# Patient Record
Sex: FEMALE | Race: BLACK OR AFRICAN AMERICAN | NOT HISPANIC OR LATINO | Employment: FULL TIME | RURAL
[De-identification: names, ages, dates, MRNs, and addresses within clinical notes are randomized per-mention and may not be internally consistent; named-entity substitution may affect disease eponyms.]

---

## 2021-01-04 ENCOUNTER — HISTORICAL (OUTPATIENT)
Dept: ADMINISTRATIVE | Facility: HOSPITAL | Age: 56
End: 2021-01-04

## 2021-05-05 DIAGNOSIS — M54.50 LOW BACK PAIN: Primary | ICD-10-CM

## 2021-05-19 PROBLEM — M54.50 LOW BACK PAIN: Status: ACTIVE | Noted: 2021-05-19

## 2021-07-06 ENCOUNTER — OFFICE VISIT (OUTPATIENT)
Dept: FAMILY MEDICINE | Facility: CLINIC | Age: 56
End: 2021-07-06
Payer: COMMERCIAL

## 2021-07-06 VITALS
BODY MASS INDEX: 37.76 KG/M2 | HEIGHT: 61 IN | RESPIRATION RATE: 18 BRPM | SYSTOLIC BLOOD PRESSURE: 134 MMHG | HEART RATE: 61 BPM | WEIGHT: 200 LBS | DIASTOLIC BLOOD PRESSURE: 84 MMHG | OXYGEN SATURATION: 97 %

## 2021-07-06 DIAGNOSIS — L03.116 CELLULITIS OF LEFT LOWER EXTREMITY: Primary | ICD-10-CM

## 2021-07-06 PROCEDURE — 87186 SC STD MICRODIL/AGAR DIL: CPT | Mod: ,,, | Performed by: CLINICAL MEDICAL LABORATORY

## 2021-07-06 PROCEDURE — 3008F PR BODY MASS INDEX (BMI) DOCUMENTED: ICD-10-PCS | Mod: ,,, | Performed by: FAMILY MEDICINE

## 2021-07-06 PROCEDURE — 87077 CULTURE AEROBIC IDENTIFY: CPT | Mod: ,,, | Performed by: CLINICAL MEDICAL LABORATORY

## 2021-07-06 PROCEDURE — 99213 OFFICE O/P EST LOW 20 MIN: CPT | Mod: 25,,, | Performed by: FAMILY MEDICINE

## 2021-07-06 PROCEDURE — 87070 CULTURE, WOUND: ICD-10-PCS | Mod: ,,, | Performed by: CLINICAL MEDICAL LABORATORY

## 2021-07-06 PROCEDURE — 96372 THER/PROPH/DIAG INJ SC/IM: CPT | Mod: ,,, | Performed by: FAMILY MEDICINE

## 2021-07-06 PROCEDURE — 96372 PR INJECTION,THERAP/PROPH/DIAG2ST, IM OR SUBCUT: ICD-10-PCS | Mod: ,,, | Performed by: FAMILY MEDICINE

## 2021-07-06 PROCEDURE — 87186 CULTURE, WOUND: ICD-10-PCS | Mod: ,,, | Performed by: CLINICAL MEDICAL LABORATORY

## 2021-07-06 PROCEDURE — 3008F BODY MASS INDEX DOCD: CPT | Mod: ,,, | Performed by: FAMILY MEDICINE

## 2021-07-06 PROCEDURE — 87070 CULTURE OTHR SPECIMN AEROBIC: CPT | Mod: ,,, | Performed by: CLINICAL MEDICAL LABORATORY

## 2021-07-06 PROCEDURE — 87077 CULTURE, WOUND: ICD-10-PCS | Mod: ,,, | Performed by: CLINICAL MEDICAL LABORATORY

## 2021-07-06 PROCEDURE — 99213 PR OFFICE/OUTPT VISIT, EST, LEVL III, 20-29 MIN: ICD-10-PCS | Mod: 25,,, | Performed by: FAMILY MEDICINE

## 2021-07-06 RX ORDER — CLINDAMYCIN HYDROCHLORIDE 300 MG/1
300 CAPSULE ORAL EVERY 8 HOURS
Qty: 21 CAPSULE | Refills: 0 | Status: SHIPPED | OUTPATIENT
Start: 2021-07-06 | End: 2021-07-12 | Stop reason: SDUPTHER

## 2021-07-06 RX ORDER — LIDOCAINE 30 MG/G
2 CREAM TOPICAL 4 TIMES DAILY PRN
Qty: 85 G | Refills: 1 | Status: SHIPPED | OUTPATIENT
Start: 2021-07-06 | End: 2021-08-05

## 2021-07-06 RX ORDER — ATENOLOL 50 MG/1
50 TABLET ORAL DAILY
COMMUNITY
Start: 2021-04-13 | End: 2021-09-16 | Stop reason: SDUPTHER

## 2021-07-06 RX ORDER — FLUCONAZOLE 150 MG/1
150 TABLET ORAL DAILY
Qty: 1 TABLET | Refills: 1 | Status: SHIPPED | OUTPATIENT
Start: 2021-07-06 | End: 2021-07-07

## 2021-07-08 ENCOUNTER — OFFICE VISIT (OUTPATIENT)
Dept: FAMILY MEDICINE | Facility: CLINIC | Age: 56
End: 2021-07-08
Payer: COMMERCIAL

## 2021-07-08 VITALS
BODY MASS INDEX: 38.26 KG/M2 | DIASTOLIC BLOOD PRESSURE: 73 MMHG | WEIGHT: 202.63 LBS | HEIGHT: 61 IN | OXYGEN SATURATION: 96 % | TEMPERATURE: 97 F | HEART RATE: 60 BPM | RESPIRATION RATE: 18 BRPM | SYSTOLIC BLOOD PRESSURE: 114 MMHG

## 2021-07-08 DIAGNOSIS — L03.116 CELLULITIS OF LEFT LOWER EXTREMITY: Primary | ICD-10-CM

## 2021-07-08 PROBLEM — I10 ESSENTIAL HYPERTENSION: Status: ACTIVE | Noted: 2021-07-08

## 2021-07-08 LAB — MICROORGANISM SPEC CULT: ABNORMAL

## 2021-07-08 PROCEDURE — 99214 PR OFFICE/OUTPT VISIT, EST, LEVL IV, 30-39 MIN: ICD-10-PCS | Mod: 25,,, | Performed by: FAMILY MEDICINE

## 2021-07-08 PROCEDURE — 99214 OFFICE O/P EST MOD 30 MIN: CPT | Mod: 25,,, | Performed by: FAMILY MEDICINE

## 2021-07-08 PROCEDURE — 10061 I&D ABSCESS COMP/MULTIPLE: CPT | Mod: ,,, | Performed by: FAMILY MEDICINE

## 2021-07-08 PROCEDURE — 10061 PR DRAIN SKIN ABSCESS COMPLIC: ICD-10-PCS | Mod: ,,, | Performed by: FAMILY MEDICINE

## 2021-07-08 PROCEDURE — 3008F PR BODY MASS INDEX (BMI) DOCUMENTED: ICD-10-PCS | Mod: ,,, | Performed by: FAMILY MEDICINE

## 2021-07-08 PROCEDURE — 3008F BODY MASS INDEX DOCD: CPT | Mod: ,,, | Performed by: FAMILY MEDICINE

## 2021-07-08 RX ORDER — CEPHALEXIN 500 MG/1
500 CAPSULE ORAL EVERY 8 HOURS
Qty: 21 CAPSULE | Refills: 0 | Status: SHIPPED | OUTPATIENT
Start: 2021-07-08 | End: 2021-07-08

## 2021-07-08 RX ORDER — HYDROCODONE BITARTRATE AND ACETAMINOPHEN 5; 325 MG/1; MG/1
1 TABLET ORAL EVERY 6 HOURS PRN
Qty: 15 TABLET | Refills: 0 | Status: SHIPPED | OUTPATIENT
Start: 2021-07-08 | End: 2021-09-16

## 2021-07-12 ENCOUNTER — OFFICE VISIT (OUTPATIENT)
Dept: FAMILY MEDICINE | Facility: CLINIC | Age: 56
End: 2021-07-12
Payer: COMMERCIAL

## 2021-07-12 VITALS
HEART RATE: 61 BPM | DIASTOLIC BLOOD PRESSURE: 86 MMHG | OXYGEN SATURATION: 98 % | WEIGHT: 198.19 LBS | BODY MASS INDEX: 37.42 KG/M2 | RESPIRATION RATE: 18 BRPM | SYSTOLIC BLOOD PRESSURE: 131 MMHG | HEIGHT: 61 IN

## 2021-07-12 DIAGNOSIS — L03.116 CELLULITIS OF LEFT LOWER EXTREMITY: Primary | ICD-10-CM

## 2021-07-12 PROCEDURE — 99024 POSTOP FOLLOW-UP VISIT: CPT | Mod: ,,, | Performed by: FAMILY MEDICINE

## 2021-07-12 PROCEDURE — 99024 PR POST-OP FOLLOW-UP VISIT: ICD-10-PCS | Mod: ,,, | Performed by: FAMILY MEDICINE

## 2021-07-12 RX ORDER — CLINDAMYCIN HYDROCHLORIDE 300 MG/1
300 CAPSULE ORAL EVERY 8 HOURS
Qty: 21 CAPSULE | Refills: 0 | Status: SHIPPED | OUTPATIENT
Start: 2021-07-12 | End: 2021-07-19

## 2021-07-15 ENCOUNTER — OFFICE VISIT (OUTPATIENT)
Dept: FAMILY MEDICINE | Facility: CLINIC | Age: 56
End: 2021-07-15
Payer: COMMERCIAL

## 2021-07-15 VITALS
HEART RATE: 61 BPM | RESPIRATION RATE: 18 BRPM | WEIGHT: 199.38 LBS | HEIGHT: 61 IN | SYSTOLIC BLOOD PRESSURE: 133 MMHG | OXYGEN SATURATION: 96 % | TEMPERATURE: 98 F | BODY MASS INDEX: 37.64 KG/M2 | DIASTOLIC BLOOD PRESSURE: 79 MMHG

## 2021-07-15 DIAGNOSIS — L03.116 CELLULITIS OF LEFT LOWER EXTREMITY: Primary | ICD-10-CM

## 2021-07-15 PROCEDURE — 3008F PR BODY MASS INDEX (BMI) DOCUMENTED: ICD-10-PCS | Mod: ,,, | Performed by: FAMILY MEDICINE

## 2021-07-15 PROCEDURE — 99024 PR POST-OP FOLLOW-UP VISIT: ICD-10-PCS | Mod: ,,, | Performed by: FAMILY MEDICINE

## 2021-07-15 PROCEDURE — 99024 POSTOP FOLLOW-UP VISIT: CPT | Mod: ,,, | Performed by: FAMILY MEDICINE

## 2021-07-15 PROCEDURE — 3008F BODY MASS INDEX DOCD: CPT | Mod: ,,, | Performed by: FAMILY MEDICINE

## 2021-09-16 ENCOUNTER — OFFICE VISIT (OUTPATIENT)
Dept: FAMILY MEDICINE | Facility: CLINIC | Age: 56
End: 2021-09-16
Payer: COMMERCIAL

## 2021-09-16 VITALS
WEIGHT: 200.81 LBS | TEMPERATURE: 97 F | RESPIRATION RATE: 18 BRPM | BODY MASS INDEX: 37.91 KG/M2 | OXYGEN SATURATION: 99 % | HEART RATE: 59 BPM | HEIGHT: 61 IN | SYSTOLIC BLOOD PRESSURE: 128 MMHG | DIASTOLIC BLOOD PRESSURE: 82 MMHG

## 2021-09-16 DIAGNOSIS — Z12.31 SCREENING MAMMOGRAM, ENCOUNTER FOR: ICD-10-CM

## 2021-09-16 DIAGNOSIS — Z13.220 ENCOUNTER FOR SCREENING FOR LIPID DISORDER: ICD-10-CM

## 2021-09-16 DIAGNOSIS — I10 ESSENTIAL HYPERTENSION: ICD-10-CM

## 2021-09-16 DIAGNOSIS — Z12.11 ENCOUNTER FOR SCREENING COLONOSCOPY: ICD-10-CM

## 2021-09-16 DIAGNOSIS — Z00.01 ANNUAL VISIT FOR GENERAL ADULT MEDICAL EXAMINATION WITH ABNORMAL FINDINGS: Primary | ICD-10-CM

## 2021-09-16 DIAGNOSIS — Z13.1 DIABETES MELLITUS SCREENING: ICD-10-CM

## 2021-09-16 DIAGNOSIS — Z90.710 HISTORY OF HYSTERECTOMY: ICD-10-CM

## 2021-09-16 PROCEDURE — 99396 PREV VISIT EST AGE 40-64: CPT | Mod: ,,, | Performed by: FAMILY MEDICINE

## 2021-09-16 PROCEDURE — 3008F PR BODY MASS INDEX (BMI) DOCUMENTED: ICD-10-PCS | Mod: ,,, | Performed by: FAMILY MEDICINE

## 2021-09-16 PROCEDURE — 3074F SYST BP LT 130 MM HG: CPT | Mod: ,,, | Performed by: FAMILY MEDICINE

## 2021-09-16 PROCEDURE — 80061 LIPID PANEL: CPT | Mod: ,,, | Performed by: CLINICAL MEDICAL LABORATORY

## 2021-09-16 PROCEDURE — 99396 PR PREVENTIVE VISIT,EST,40-64: ICD-10-PCS | Mod: ,,, | Performed by: FAMILY MEDICINE

## 2021-09-16 PROCEDURE — 80053 COMPREHEN METABOLIC PANEL: CPT | Mod: ,,, | Performed by: CLINICAL MEDICAL LABORATORY

## 2021-09-16 PROCEDURE — 1159F PR MEDICATION LIST DOCUMENTED IN MEDICAL RECORD: ICD-10-PCS | Mod: ,,, | Performed by: FAMILY MEDICINE

## 2021-09-16 PROCEDURE — 1160F PR REVIEW ALL MEDS BY PRESCRIBER/CLIN PHARMACIST DOCUMENTED: ICD-10-PCS | Mod: ,,, | Performed by: FAMILY MEDICINE

## 2021-09-16 PROCEDURE — 3074F PR MOST RECENT SYSTOLIC BLOOD PRESSURE < 130 MM HG: ICD-10-PCS | Mod: ,,, | Performed by: FAMILY MEDICINE

## 2021-09-16 PROCEDURE — 80061 LIPID PANEL: ICD-10-PCS | Mod: ,,, | Performed by: CLINICAL MEDICAL LABORATORY

## 2021-09-16 PROCEDURE — 3008F BODY MASS INDEX DOCD: CPT | Mod: ,,, | Performed by: FAMILY MEDICINE

## 2021-09-16 PROCEDURE — 80053 COMPREHENSIVE METABOLIC PANEL: ICD-10-PCS | Mod: ,,, | Performed by: CLINICAL MEDICAL LABORATORY

## 2021-09-16 PROCEDURE — 3079F PR MOST RECENT DIASTOLIC BLOOD PRESSURE 80-89 MM HG: ICD-10-PCS | Mod: ,,, | Performed by: FAMILY MEDICINE

## 2021-09-16 PROCEDURE — 3079F DIAST BP 80-89 MM HG: CPT | Mod: ,,, | Performed by: FAMILY MEDICINE

## 2021-09-16 PROCEDURE — 1159F MED LIST DOCD IN RCRD: CPT | Mod: ,,, | Performed by: FAMILY MEDICINE

## 2021-09-16 PROCEDURE — 1160F RVW MEDS BY RX/DR IN RCRD: CPT | Mod: ,,, | Performed by: FAMILY MEDICINE

## 2021-09-16 RX ORDER — ATENOLOL 50 MG/1
50 TABLET ORAL DAILY
Qty: 90 TABLET | Refills: 3 | Status: SHIPPED | OUTPATIENT
Start: 2021-09-16 | End: 2022-04-21

## 2021-09-17 LAB
ALBUMIN SERPL BCP-MCNC: 3.5 G/DL (ref 3.5–5)
ALBUMIN/GLOB SERPL: 0.8 {RATIO}
ALP SERPL-CCNC: 86 U/L (ref 41–108)
ALT SERPL W P-5'-P-CCNC: 34 U/L (ref 13–56)
ANION GAP SERPL CALCULATED.3IONS-SCNC: 9 MMOL/L (ref 7–16)
AST SERPL W P-5'-P-CCNC: 20 U/L (ref 15–37)
BILIRUB SERPL-MCNC: 0.4 MG/DL (ref 0–1.2)
BUN SERPL-MCNC: 12 MG/DL (ref 7–18)
BUN/CREAT SERPL: 12 (ref 6–20)
CALCIUM SERPL-MCNC: 9.7 MG/DL (ref 8.5–10.1)
CHLORIDE SERPL-SCNC: 105 MMOL/L (ref 98–107)
CHOLEST SERPL-MCNC: 147 MG/DL (ref 0–200)
CHOLEST/HDLC SERPL: 2.8 {RATIO}
CO2 SERPL-SCNC: 31 MMOL/L (ref 21–32)
CREAT SERPL-MCNC: 1.03 MG/DL (ref 0.55–1.02)
GLOBULIN SER-MCNC: 4.4 G/DL (ref 2–4)
GLUCOSE SERPL-MCNC: 112 MG/DL (ref 74–106)
HDLC SERPL-MCNC: 52 MG/DL (ref 40–60)
LDLC SERPL CALC-MCNC: 78 MG/DL
LDLC/HDLC SERPL: 1.5 {RATIO}
NONHDLC SERPL-MCNC: 95 MG/DL
POTASSIUM SERPL-SCNC: 4 MMOL/L (ref 3.5–5.1)
PROT SERPL-MCNC: 7.9 G/DL (ref 6.4–8.2)
SODIUM SERPL-SCNC: 141 MMOL/L (ref 136–145)
TRIGL SERPL-MCNC: 86 MG/DL (ref 35–150)
VLDLC SERPL-MCNC: 17 MG/DL

## 2021-09-20 PROBLEM — R73.01 IMPAIRED FASTING GLUCOSE: Status: ACTIVE | Noted: 2021-09-20

## 2021-09-24 ENCOUNTER — HOSPITAL ENCOUNTER (OUTPATIENT)
Dept: RADIOLOGY | Facility: HOSPITAL | Age: 56
Discharge: HOME | End: 2021-09-24
Attending: FAMILY MEDICINE
Payer: COMMERCIAL

## 2021-09-24 VITALS — HEIGHT: 61 IN | WEIGHT: 200 LBS | BODY MASS INDEX: 37.76 KG/M2

## 2021-09-24 DIAGNOSIS — Z12.31 SCREENING MAMMOGRAM, ENCOUNTER FOR: ICD-10-CM

## 2021-09-24 PROCEDURE — 77067 MAMMO DIGITAL SCREENING BILAT: ICD-10-PCS | Mod: 26,,, | Performed by: RADIOLOGY

## 2021-09-24 PROCEDURE — 77067 SCR MAMMO BI INCL CAD: CPT | Mod: 26,,, | Performed by: RADIOLOGY

## 2021-09-24 PROCEDURE — 77067 SCR MAMMO BI INCL CAD: CPT | Mod: TC

## 2021-10-21 ENCOUNTER — OFFICE VISIT (OUTPATIENT)
Dept: FAMILY MEDICINE | Facility: CLINIC | Age: 56
End: 2021-10-21
Payer: COMMERCIAL

## 2021-10-21 VITALS
DIASTOLIC BLOOD PRESSURE: 80 MMHG | BODY MASS INDEX: 38.07 KG/M2 | RESPIRATION RATE: 18 BRPM | HEART RATE: 62 BPM | SYSTOLIC BLOOD PRESSURE: 123 MMHG | WEIGHT: 201.63 LBS | TEMPERATURE: 98 F | OXYGEN SATURATION: 97 % | HEIGHT: 61 IN

## 2021-10-21 DIAGNOSIS — M25.511 CHRONIC PAIN OF BOTH SHOULDERS: Primary | ICD-10-CM

## 2021-10-21 DIAGNOSIS — M25.512 CHRONIC PAIN OF BOTH SHOULDERS: Primary | ICD-10-CM

## 2021-10-21 DIAGNOSIS — M54.9 DORSALGIA, UNSPECIFIED: ICD-10-CM

## 2021-10-21 DIAGNOSIS — M54.41 CHRONIC BILATERAL LOW BACK PAIN WITH BILATERAL SCIATICA: ICD-10-CM

## 2021-10-21 DIAGNOSIS — G89.29 CHRONIC BILATERAL LOW BACK PAIN WITH BILATERAL SCIATICA: ICD-10-CM

## 2021-10-21 DIAGNOSIS — G89.29 CHRONIC PAIN OF BOTH SHOULDERS: Primary | ICD-10-CM

## 2021-10-21 DIAGNOSIS — M54.42 CHRONIC BILATERAL LOW BACK PAIN WITH BILATERAL SCIATICA: ICD-10-CM

## 2021-10-21 PROCEDURE — 3074F PR MOST RECENT SYSTOLIC BLOOD PRESSURE < 130 MM HG: ICD-10-PCS | Mod: ,,, | Performed by: FAMILY MEDICINE

## 2021-10-21 PROCEDURE — 3008F BODY MASS INDEX DOCD: CPT | Mod: ,,, | Performed by: FAMILY MEDICINE

## 2021-10-21 PROCEDURE — 1160F PR REVIEW ALL MEDS BY PRESCRIBER/CLIN PHARMACIST DOCUMENTED: ICD-10-PCS | Mod: ,,, | Performed by: FAMILY MEDICINE

## 2021-10-21 PROCEDURE — 3079F PR MOST RECENT DIASTOLIC BLOOD PRESSURE 80-89 MM HG: ICD-10-PCS | Mod: ,,, | Performed by: FAMILY MEDICINE

## 2021-10-21 PROCEDURE — 99214 OFFICE O/P EST MOD 30 MIN: CPT | Mod: 25,,, | Performed by: FAMILY MEDICINE

## 2021-10-21 PROCEDURE — 3074F SYST BP LT 130 MM HG: CPT | Mod: ,,, | Performed by: FAMILY MEDICINE

## 2021-10-21 PROCEDURE — 1159F PR MEDICATION LIST DOCUMENTED IN MEDICAL RECORD: ICD-10-PCS | Mod: ,,, | Performed by: FAMILY MEDICINE

## 2021-10-21 PROCEDURE — 3079F DIAST BP 80-89 MM HG: CPT | Mod: ,,, | Performed by: FAMILY MEDICINE

## 2021-10-21 PROCEDURE — 3008F PR BODY MASS INDEX (BMI) DOCUMENTED: ICD-10-PCS | Mod: ,,, | Performed by: FAMILY MEDICINE

## 2021-10-21 PROCEDURE — 20610 DRAIN/INJ JOINT/BURSA W/O US: CPT | Mod: 50,,, | Performed by: FAMILY MEDICINE

## 2021-10-21 PROCEDURE — 1160F RVW MEDS BY RX/DR IN RCRD: CPT | Mod: ,,, | Performed by: FAMILY MEDICINE

## 2021-10-21 PROCEDURE — 20610 PR DRAIN/INJECT LARGE JOINT/BURSA: ICD-10-PCS | Mod: 50,,, | Performed by: FAMILY MEDICINE

## 2021-10-21 PROCEDURE — 1159F MED LIST DOCD IN RCRD: CPT | Mod: ,,, | Performed by: FAMILY MEDICINE

## 2021-10-21 PROCEDURE — 99214 PR OFFICE/OUTPT VISIT, EST, LEVL IV, 30-39 MIN: ICD-10-PCS | Mod: 25,,, | Performed by: FAMILY MEDICINE

## 2021-10-21 RX ORDER — METHYLPREDNISOLONE 4 MG/1
TABLET ORAL
Qty: 21 EACH | Refills: 0 | Status: SHIPPED | OUTPATIENT
Start: 2021-10-21 | End: 2021-11-11

## 2021-10-21 RX ORDER — GABAPENTIN 100 MG/1
100 CAPSULE ORAL NIGHTLY
Qty: 30 CAPSULE | Refills: 11 | Status: SHIPPED | OUTPATIENT
Start: 2021-10-21 | End: 2023-01-05

## 2021-10-21 RX ORDER — CYCLOBENZAPRINE HCL 10 MG
10 TABLET ORAL 3 TIMES DAILY PRN
Qty: 30 TABLET | Refills: 1 | Status: SHIPPED | OUTPATIENT
Start: 2021-10-21 | End: 2021-10-31

## 2021-10-26 ENCOUNTER — HOSPITAL ENCOUNTER (OUTPATIENT)
Dept: RADIOLOGY | Facility: HOSPITAL | Age: 56
Discharge: HOME | End: 2021-10-26
Attending: FAMILY MEDICINE
Payer: COMMERCIAL

## 2021-10-26 DIAGNOSIS — G89.29 CHRONIC PAIN OF BOTH SHOULDERS: ICD-10-CM

## 2021-10-26 DIAGNOSIS — M25.511 CHRONIC PAIN OF BOTH SHOULDERS: ICD-10-CM

## 2021-10-26 DIAGNOSIS — M25.512 CHRONIC PAIN OF BOTH SHOULDERS: ICD-10-CM

## 2021-10-26 DIAGNOSIS — M75.102 TEAR OF LEFT SUPRASPINATUS TENDON: Primary | ICD-10-CM

## 2021-10-26 PROCEDURE — 73221 MRI JOINT UPR EXTREM W/O DYE: CPT | Mod: TC,RT

## 2021-10-26 PROCEDURE — 73221 MRI JOINT UPR EXTREM W/O DYE: CPT | Mod: TC,LT

## 2022-01-28 ENCOUNTER — OFFICE VISIT (OUTPATIENT)
Dept: FAMILY MEDICINE | Facility: CLINIC | Age: 57
End: 2022-01-28
Payer: COMMERCIAL

## 2022-01-28 VITALS
RESPIRATION RATE: 18 BRPM | TEMPERATURE: 99 F | DIASTOLIC BLOOD PRESSURE: 70 MMHG | BODY MASS INDEX: 38.21 KG/M2 | WEIGHT: 202.38 LBS | SYSTOLIC BLOOD PRESSURE: 124 MMHG | OXYGEN SATURATION: 98 % | HEART RATE: 71 BPM | HEIGHT: 61 IN

## 2022-01-28 DIAGNOSIS — Z20.828 CONTACT WITH AND (SUSPECTED) EXPOSURE TO OTHER VIRAL COMMUNICABLE DISEASES: ICD-10-CM

## 2022-01-28 DIAGNOSIS — J32.9 RHINOSINUSITIS: ICD-10-CM

## 2022-01-28 DIAGNOSIS — R05.9 COUGH: ICD-10-CM

## 2022-01-28 DIAGNOSIS — U07.1 COVID: Primary | ICD-10-CM

## 2022-01-28 LAB
CTP QC/QA: YES
FLUAV AG NPH QL: NEGATIVE
FLUBV AG NPH QL: NEGATIVE
SARS-COV-2 AG RESP QL IA.RAPID: POSITIVE

## 2022-01-28 PROCEDURE — 3008F BODY MASS INDEX DOCD: CPT | Mod: ,,, | Performed by: NURSE PRACTITIONER

## 2022-01-28 PROCEDURE — 1160F PR REVIEW ALL MEDS BY PRESCRIBER/CLIN PHARMACIST DOCUMENTED: ICD-10-PCS | Mod: ,,, | Performed by: NURSE PRACTITIONER

## 2022-01-28 PROCEDURE — 3078F DIAST BP <80 MM HG: CPT | Mod: ,,, | Performed by: NURSE PRACTITIONER

## 2022-01-28 PROCEDURE — 3074F SYST BP LT 130 MM HG: CPT | Mod: ,,, | Performed by: NURSE PRACTITIONER

## 2022-01-28 PROCEDURE — 99213 OFFICE O/P EST LOW 20 MIN: CPT | Mod: ,,, | Performed by: NURSE PRACTITIONER

## 2022-01-28 PROCEDURE — 99213 PR OFFICE/OUTPT VISIT, EST, LEVL III, 20-29 MIN: ICD-10-PCS | Mod: ,,, | Performed by: NURSE PRACTITIONER

## 2022-01-28 PROCEDURE — 3074F PR MOST RECENT SYSTOLIC BLOOD PRESSURE < 130 MM HG: ICD-10-PCS | Mod: ,,, | Performed by: NURSE PRACTITIONER

## 2022-01-28 PROCEDURE — 87428 SARSCOV & INF VIR A&B AG IA: CPT | Mod: QW,,, | Performed by: NURSE PRACTITIONER

## 2022-01-28 PROCEDURE — 87428 POCT SARS-COV2 (COVID) WITH FLU ANTIGEN: ICD-10-PCS | Mod: QW,,, | Performed by: NURSE PRACTITIONER

## 2022-01-28 PROCEDURE — 1160F RVW MEDS BY RX/DR IN RCRD: CPT | Mod: ,,, | Performed by: NURSE PRACTITIONER

## 2022-01-28 PROCEDURE — 3078F PR MOST RECENT DIASTOLIC BLOOD PRESSURE < 80 MM HG: ICD-10-PCS | Mod: ,,, | Performed by: NURSE PRACTITIONER

## 2022-01-28 PROCEDURE — 1159F MED LIST DOCD IN RCRD: CPT | Mod: ,,, | Performed by: NURSE PRACTITIONER

## 2022-01-28 PROCEDURE — 1159F PR MEDICATION LIST DOCUMENTED IN MEDICAL RECORD: ICD-10-PCS | Mod: ,,, | Performed by: NURSE PRACTITIONER

## 2022-01-28 PROCEDURE — 3008F PR BODY MASS INDEX (BMI) DOCUMENTED: ICD-10-PCS | Mod: ,,, | Performed by: NURSE PRACTITIONER

## 2022-01-28 RX ORDER — AZITHROMYCIN 250 MG/1
TABLET, FILM COATED ORAL
Qty: 6 TABLET | Refills: 0 | Status: SHIPPED | OUTPATIENT
Start: 2022-01-28 | End: 2022-02-02

## 2022-01-28 RX ORDER — METHYLPREDNISOLONE 4 MG/1
TABLET ORAL
Qty: 21 EACH | Refills: 0 | Status: SHIPPED | OUTPATIENT
Start: 2022-01-28 | End: 2022-02-18

## 2022-01-28 NOTE — LETTER
January 28, 2022      [address]  [place] [address]  Phone: [phone]  Fax: [phone]       Patient: Sophia Kc   YOB: 1965  Date of Visit: 01/28/2022    To Whom It May Concern:    Maribeth Kc  was at [facility] on 01/28/2022. She has tested positive for Covid-19. Mrs. Kc may return to work/school on 02/01/2022 with no restrictions. If you have any questions or concerns, or if I can be of further assistance, please do not hesitate to contact me.    Sincerely,    Halle Charles RN

## 2022-02-01 NOTE — PROGRESS NOTES
STEPHANIE Jordan   [facility]  [id number] [place] [address] [place] [address]  [phone]      PATIENT NAME: Sophia Kc  : 1965  DATE: 22  MRN: [id number]      Billing Provider: STEPHANIE Jordan  Level of Service:   Patient PCP Information     Provider PCP Type    STEPHANIE Griffin General          Reason for Visit / Chief Complaint: Dizziness, Fatigue, Generalized Body Aches, and Headache       Update PCP  Update Chief Complaint         History of Present Illness / Problem Focused Workflow     Presents with complaints of dizziness, fatigue, body aches, headache for several days  Concerns of covid    Review of Systems     Review of Systems   Constitutional: Positive for fatigue. Negative for chills and fever.   HENT: Positive for congestion. Negative for ear pain and sore throat.    Eyes: Negative for discharge.   Respiratory: Positive for cough. Negative for shortness of breath.    Cardiovascular: Negative for chest pain.   Gastrointestinal: Negative for abdominal pain, diarrhea and nausea.   Musculoskeletal: Negative for gait problem.   Neurological: Positive for headaches. Negative for dizziness and weakness.   Psychiatric/Behavioral: Negative for dysphoric mood. The patient is not nervous/anxious.        Medical / Social / Family History     Past Medical History:   Diagnosis Date    Hx of colonoscopy 10/05/2021    negative    Hypertension        Past Surgical History:   Procedure Laterality Date    HYSTERECTOMY         Social History  Ms.  reports that she has never smoked. She has never used smokeless tobacco. She reports current alcohol use. She reports that she does not use drugs.    Family History  Ms.'s family history includes Breast cancer in her maternal aunt.    Medications and Allergies     Medications  Outpatient Medications Marked as Taking for the 22 encounter (Office Visit) with STEPHANIE Jordan   Medication Sig Dispense Refill     atenoloL (TENORMIN) 50 MG tablet Take 1 tablet (50 mg total) by mouth once daily. 90 tablet 3    gabapentin (NEURONTIN) 100 MG capsule Take 1 capsule (100 mg total) by mouth every evening. 30 capsule 11       Allergies  Review of patient's allergies indicates:   Allergen Reactions    Lisinopril        Physical Examination     Vitals:    01/28/22 1334   BP: 124/70   Pulse: 71   Resp: 18   Temp: 98.8 °F (37.1 °C)     Physical Exam  Constitutional:       General: She is not in acute distress.  HENT:      Head: Normocephalic.      Nose: Congestion present.      Mouth/Throat:      Mouth: Mucous membranes are moist.   Eyes:      Extraocular Movements: Extraocular movements intact.   Cardiovascular:      Rate and Rhythm: Normal rate.      Heart sounds: Normal heart sounds.   Pulmonary:      Effort: Pulmonary effort is normal. No respiratory distress.      Breath sounds: No wheezing.   Abdominal:      General: Bowel sounds are normal.      Palpations: Abdomen is soft.   Musculoskeletal:         General: Normal range of motion.      Cervical back: Normal range of motion.   Skin:     General: Skin is warm.   Neurological:      Mental Status: She is alert and oriented to person, place, and time.   Psychiatric:         Mood and Affect: Mood normal.           Imaging / Labs       Office Visit on 01/28/2022   Component Date Value Ref Range Status    SARS Coronavirus 2 Antigen 01/28/2022 Positive* Negative Final    Rapid Influenza A Ag 01/28/2022 Negative  Negative Final    Rapid Influenza B Ag 01/28/2022 Negative  Negative Final     Acceptable 01/28/2022 Yes   Final       Assessment and Plan (including Health Maintenance)      Problem List  Smart Sets  Document Outside HM   :    Health Maintenance Due   Topic Date Due    Hepatitis C Screening  Never done    COVID-19 Vaccine (1) Never done    HIV Screening  Never done    TETANUS VACCINE  Never done    Colorectal Cancer Screening  Never done     Shingles Vaccine (1 of 2) Never done    Influenza Vaccine (1) Never done       Problem List Items Addressed This Visit        Other    COVID - Primary      Other Visit Diagnoses     Contact with and (suspected) exposure to other viral communicable diseases        Relevant Orders    POCT SARS-COV2 (COVID) with Flu Antigen (Completed)    Rhinosinusitis        Relevant Medications    azithromycin (Z-CAROLANN) 250 MG tablet    methylPREDNISolone (MEDROL DOSEPACK) 4 mg tablet    Cough        Relevant Medications    methylPREDNISolone (MEDROL DOSEPACK) 4 mg tablet          Health Maintenance Topics with due status: Not Due       Topic Last Completion Date    Lipid Panel 09/16/2021    Mammogram 09/24/2021       Future Appointments   Date Time Provider Department Center   3/21/2022  8:00 AM MD MIKE Gunderson   9/19/2022  8:45 AM MD MIKE Gunderson          Signature:  STEPHANIE Jordan  [address] [address]  [phone]    Date of encounter: 1/28/22

## 2022-04-20 DIAGNOSIS — M25.512 LEFT SHOULDER PAIN, UNSPECIFIED CHRONICITY: Primary | ICD-10-CM

## 2022-04-21 ENCOUNTER — OFFICE VISIT (OUTPATIENT)
Dept: ORTHOPEDICS | Facility: CLINIC | Age: 57
End: 2022-04-21
Payer: COMMERCIAL

## 2022-04-21 ENCOUNTER — HOSPITAL ENCOUNTER (OUTPATIENT)
Dept: RADIOLOGY | Facility: HOSPITAL | Age: 57
Discharge: HOME | End: 2022-04-21
Attending: ORTHOPAEDIC SURGERY
Payer: COMMERCIAL

## 2022-04-21 ENCOUNTER — OFFICE VISIT (OUTPATIENT)
Dept: FAMILY MEDICINE | Facility: CLINIC | Age: 57
End: 2022-04-21
Payer: COMMERCIAL

## 2022-04-21 VITALS
SYSTOLIC BLOOD PRESSURE: 186 MMHG | OXYGEN SATURATION: 99 % | HEART RATE: 63 BPM | DIASTOLIC BLOOD PRESSURE: 96 MMHG | BODY MASS INDEX: 37.76 KG/M2 | TEMPERATURE: 98 F | WEIGHT: 200 LBS | HEIGHT: 61 IN | RESPIRATION RATE: 20 BRPM

## 2022-04-21 VITALS — WEIGHT: 202 LBS | HEIGHT: 61 IN | BODY MASS INDEX: 38.14 KG/M2

## 2022-04-21 DIAGNOSIS — Z11.4 SCREENING FOR HIV (HUMAN IMMUNODEFICIENCY VIRUS): ICD-10-CM

## 2022-04-21 DIAGNOSIS — G89.29 CHRONIC RIGHT SHOULDER PAIN: ICD-10-CM

## 2022-04-21 DIAGNOSIS — Z23 NEED FOR VACCINATION: ICD-10-CM

## 2022-04-21 DIAGNOSIS — M25.512 LEFT SHOULDER PAIN, UNSPECIFIED CHRONICITY: Primary | ICD-10-CM

## 2022-04-21 DIAGNOSIS — M25.511 CHRONIC RIGHT SHOULDER PAIN: ICD-10-CM

## 2022-04-21 DIAGNOSIS — M25.512 LEFT SHOULDER PAIN, UNSPECIFIED CHRONICITY: ICD-10-CM

## 2022-04-21 DIAGNOSIS — I10 ESSENTIAL HYPERTENSION: Primary | ICD-10-CM

## 2022-04-21 DIAGNOSIS — Z11.59 ENCOUNTER FOR HEPATITIS C SCREENING TEST FOR LOW RISK PATIENT: ICD-10-CM

## 2022-04-21 DIAGNOSIS — M75.121 COMPLETE TEAR OF RIGHT ROTATOR CUFF, UNSPECIFIED WHETHER TRAUMATIC: ICD-10-CM

## 2022-04-21 DIAGNOSIS — R73.01 IMPAIRED FASTING GLUCOSE: ICD-10-CM

## 2022-04-21 LAB
ALBUMIN SERPL BCP-MCNC: 3.6 G/DL (ref 3.5–5)
ALBUMIN/GLOB SERPL: 0.9 {RATIO}
ALP SERPL-CCNC: 91 U/L (ref 41–108)
ALT SERPL W P-5'-P-CCNC: 41 U/L (ref 13–56)
ANION GAP SERPL CALCULATED.3IONS-SCNC: 10 MMOL/L (ref 7–16)
AST SERPL W P-5'-P-CCNC: 19 U/L (ref 15–37)
BILIRUB SERPL-MCNC: 0.4 MG/DL (ref 0–1.2)
BUN SERPL-MCNC: 10 MG/DL (ref 7–18)
BUN/CREAT SERPL: 13 (ref 6–20)
CALCIUM SERPL-MCNC: 9.3 MG/DL (ref 8.5–10.1)
CHLORIDE SERPL-SCNC: 106 MMOL/L (ref 98–107)
CHOLEST SERPL-MCNC: 159 MG/DL (ref 0–200)
CHOLEST/HDLC SERPL: 3 {RATIO}
CO2 SERPL-SCNC: 27 MMOL/L (ref 21–32)
CREAT SERPL-MCNC: 0.8 MG/DL (ref 0.55–1.02)
GLOBULIN SER-MCNC: 4.1 G/DL (ref 2–4)
GLUCOSE SERPL-MCNC: 93 MG/DL (ref 74–106)
HDLC SERPL-MCNC: 53 MG/DL (ref 40–60)
HIV 1+O+2 AB SERPL QL: NORMAL
LDLC SERPL CALC-MCNC: 87 MG/DL
LDLC/HDLC SERPL: 1.6 {RATIO}
NONHDLC SERPL-MCNC: 106 MG/DL
POTASSIUM SERPL-SCNC: 3.7 MMOL/L (ref 3.5–5.1)
PROT SERPL-MCNC: 7.7 G/DL (ref 6.4–8.2)
SODIUM SERPL-SCNC: 139 MMOL/L (ref 136–145)
TRIGL SERPL-MCNC: 97 MG/DL (ref 35–150)
VLDLC SERPL-MCNC: 19 MG/DL

## 2022-04-21 PROCEDURE — 80061 LIPID PANEL: CPT | Mod: ,,, | Performed by: CLINICAL MEDICAL LABORATORY

## 2022-04-21 PROCEDURE — 73030 X-RAY EXAM OF SHOULDER: CPT | Mod: 26,RT,, | Performed by: ORTHOPAEDIC SURGERY

## 2022-04-21 PROCEDURE — 3077F SYST BP >= 140 MM HG: CPT | Mod: ,,, | Performed by: FAMILY MEDICINE

## 2022-04-21 PROCEDURE — 99214 OFFICE O/P EST MOD 30 MIN: CPT | Mod: 25,,, | Performed by: FAMILY MEDICINE

## 2022-04-21 PROCEDURE — 3080F DIAST BP >= 90 MM HG: CPT | Mod: ,,, | Performed by: FAMILY MEDICINE

## 2022-04-21 PROCEDURE — 90471 TDAP VACCINE GREATER THAN OR EQUAL TO 7YO IM: ICD-10-PCS | Mod: ,,, | Performed by: FAMILY MEDICINE

## 2022-04-21 PROCEDURE — 1160F RVW MEDS BY RX/DR IN RCRD: CPT | Mod: ,,, | Performed by: FAMILY MEDICINE

## 2022-04-21 PROCEDURE — 80053 COMPREHEN METABOLIC PANEL: CPT | Mod: ,,, | Performed by: CLINICAL MEDICAL LABORATORY

## 2022-04-21 PROCEDURE — 87389 HIV 1 / 2 ANTIBODY: ICD-10-PCS | Mod: ,,, | Performed by: CLINICAL MEDICAL LABORATORY

## 2022-04-21 PROCEDURE — 80061 LIPID PANEL: ICD-10-PCS | Mod: ,,, | Performed by: CLINICAL MEDICAL LABORATORY

## 2022-04-21 PROCEDURE — 73030 XR SHOULDER COMPLETE 2 OR MORE VIEWS RIGHT: ICD-10-PCS | Mod: 26,RT,, | Performed by: ORTHOPAEDIC SURGERY

## 2022-04-21 PROCEDURE — 73030 XR SHOULDER COMPLETE 2 OR MORE VIEWS LEFT: ICD-10-PCS | Mod: 26,LT,, | Performed by: ORTHOPAEDIC SURGERY

## 2022-04-21 PROCEDURE — 1160F PR REVIEW ALL MEDS BY PRESCRIBER/CLIN PHARMACIST DOCUMENTED: ICD-10-PCS | Mod: ,,, | Performed by: FAMILY MEDICINE

## 2022-04-21 PROCEDURE — 73030 X-RAY EXAM OF SHOULDER: CPT | Mod: 26,LT,, | Performed by: ORTHOPAEDIC SURGERY

## 2022-04-21 PROCEDURE — 83036 HEMOGLOBIN A1C: ICD-10-PCS | Mod: ,,, | Performed by: CLINICAL MEDICAL LABORATORY

## 2022-04-21 PROCEDURE — 80053 COMPREHENSIVE METABOLIC PANEL: ICD-10-PCS | Mod: ,,, | Performed by: CLINICAL MEDICAL LABORATORY

## 2022-04-21 PROCEDURE — 90715 TDAP VACCINE GREATER THAN OR EQUAL TO 7YO IM: ICD-10-PCS | Mod: ,,, | Performed by: FAMILY MEDICINE

## 2022-04-21 PROCEDURE — 3008F BODY MASS INDEX DOCD: CPT | Mod: ,,, | Performed by: ORTHOPAEDIC SURGERY

## 2022-04-21 PROCEDURE — 87389 HIV-1 AG W/HIV-1&-2 AB AG IA: CPT | Mod: ,,, | Performed by: CLINICAL MEDICAL LABORATORY

## 2022-04-21 PROCEDURE — 3008F PR BODY MASS INDEX (BMI) DOCUMENTED: ICD-10-PCS | Mod: ,,, | Performed by: ORTHOPAEDIC SURGERY

## 2022-04-21 PROCEDURE — 3080F PR MOST RECENT DIASTOLIC BLOOD PRESSURE >= 90 MM HG: ICD-10-PCS | Mod: ,,, | Performed by: FAMILY MEDICINE

## 2022-04-21 PROCEDURE — 73030 X-RAY EXAM OF SHOULDER: CPT | Mod: TC,LT

## 2022-04-21 PROCEDURE — 73030 X-RAY EXAM OF SHOULDER: CPT | Mod: TC,RT

## 2022-04-21 PROCEDURE — 90715 TDAP VACCINE 7 YRS/> IM: CPT | Mod: ,,, | Performed by: FAMILY MEDICINE

## 2022-04-21 PROCEDURE — 90471 IMMUNIZATION ADMIN: CPT | Mod: ,,, | Performed by: FAMILY MEDICINE

## 2022-04-21 PROCEDURE — 86803 HEPATITIS C AB TEST: CPT | Mod: ,,, | Performed by: CLINICAL MEDICAL LABORATORY

## 2022-04-21 PROCEDURE — 83036 HEMOGLOBIN GLYCOSYLATED A1C: CPT | Mod: ,,, | Performed by: CLINICAL MEDICAL LABORATORY

## 2022-04-21 PROCEDURE — 3008F BODY MASS INDEX DOCD: CPT | Mod: ,,, | Performed by: FAMILY MEDICINE

## 2022-04-21 PROCEDURE — 1159F MED LIST DOCD IN RCRD: CPT | Mod: ,,, | Performed by: FAMILY MEDICINE

## 2022-04-21 PROCEDURE — 3077F PR MOST RECENT SYSTOLIC BLOOD PRESSURE >= 140 MM HG: ICD-10-PCS | Mod: ,,, | Performed by: FAMILY MEDICINE

## 2022-04-21 PROCEDURE — 99205 PR OFFICE/OUTPT VISIT, NEW, LEVL V, 60-74 MIN: ICD-10-PCS | Mod: ,,, | Performed by: ORTHOPAEDIC SURGERY

## 2022-04-21 PROCEDURE — 1159F PR MEDICATION LIST DOCUMENTED IN MEDICAL RECORD: ICD-10-PCS | Mod: ,,, | Performed by: FAMILY MEDICINE

## 2022-04-21 PROCEDURE — 3008F PR BODY MASS INDEX (BMI) DOCUMENTED: ICD-10-PCS | Mod: ,,, | Performed by: FAMILY MEDICINE

## 2022-04-21 PROCEDURE — 99214 PR OFFICE/OUTPT VISIT, EST, LEVL IV, 30-39 MIN: ICD-10-PCS | Mod: 25,,, | Performed by: FAMILY MEDICINE

## 2022-04-21 PROCEDURE — 86803 HEPATITIS C ANTIBODY: ICD-10-PCS | Mod: ,,, | Performed by: CLINICAL MEDICAL LABORATORY

## 2022-04-21 PROCEDURE — 99205 OFFICE O/P NEW HI 60 MIN: CPT | Mod: ,,, | Performed by: ORTHOPAEDIC SURGERY

## 2022-04-21 RX ORDER — ATENOLOL AND CHLORTHALIDONE TABLET 50; 25 MG/1; MG/1
1 TABLET ORAL DAILY
Qty: 30 TABLET | Refills: 11 | Status: SHIPPED | OUTPATIENT
Start: 2022-04-21 | End: 2023-03-22 | Stop reason: SDUPTHER

## 2022-04-21 RX ORDER — POTASSIUM CHLORIDE 750 MG/1
10 CAPSULE, EXTENDED RELEASE ORAL DAILY
Qty: 90 CAPSULE | Refills: 1 | Status: SHIPPED | OUTPATIENT
Start: 2022-04-21 | End: 2023-01-09

## 2022-04-21 NOTE — PROGRESS NOTES
HPI:   Sophia Kc is a pleasant 56 y.o. patient who reports to clinic for evaluation of left and right  Shoulder pain. Pt states she has been in pain for over a year.  She had an injection back in October which did help for several months.  Her pain returned unfortunately. She works at [employer].   Injury onset and description: none  Patient's occupation: Direct Care Worker  This is not a work related injury.   she has not had formal physical therapy  she has had previous shoulder injections.   she has had advanced imaging such as MRI.   The shoulder pain worsens with activity and overhead motion. Pain is disruptive to sleep at night. The pain is better with rest. Treatment thus far has included rest and activity modification. Here today to discuss diagnosis and treatment options.   VAS Pain Scale:  7      PAST MEDICAL HISTORY:   Past Medical History:   Diagnosis Date    Hx of colonoscopy 10/05/2021    negative    Hypertension      PAST SURGICAL HISTORY:   Past Surgical History:   Procedure Laterality Date    HYSTERECTOMY       MEDICATIONS:    Current Outpatient Medications:     atenoloL (TENORMIN) 50 MG tablet, Take 1 tablet (50 mg total) by mouth once daily., Disp: 90 tablet, Rfl: 3    gabapentin (NEURONTIN) 100 MG capsule, Take 1 capsule (100 mg total) by mouth every evening., Disp: 30 capsule, Rfl: 11  ALLERGIES:   Review of patient's allergies indicates:   Allergen Reactions    Lisinopril      REVIEW OF SYSTEMS:  Constitution: Negative. Negative for chills, fever and night sweats. HENT: Negative for congestion and headaches.  Eyes: Negative for blurred vision, left vision loss and right vision loss. Cardiovascular: Negative for chest pain and syncope. Respiratory: Negative for cough and shortness of breath.  Endocrine: Negative for polydipsia, polyphagia and polyuria. Hematologic/Lymphatic: Negative for bleeding problem. Does not bruise/bleed easily. Skin: Negative for dry skin, itching  "and rash.   Musculoskeletal: Negative for falls. Positive for hand pain and muscle weakness.     PHYSICAL EXAM:  VITAL SIGNS: Ht 5' 1" (1.549 m)   Wt 91.6 kg (202 lb)   BMI 38.17 kg/m²   General: Well-developed well-nourished 56 y.o. femalein no acute distress;Cardiovascular: Regular rhythm by palpation of distal pulse, normal color and temperature, no concerning varicosities on symptomatic side Lungs: No labored breathing or wheezing appreciated Neuro: Alert and oriented ×3 Psychiatric: well oriented to person, place and time, demonstrates normal mood and affect Skin: No rashes, lesions or ulcers, normal temperature, turgor, and texture on uninvolved extremity    General    Nursing note and vitals reviewed.  Constitutional: She is oriented to person, place, and time. She appears well-developed and well-nourished. No distress.   HENT:   Head: Normocephalic and atraumatic.   Neck: Neck supple.   Cardiovascular: Normal rate, regular rhythm and intact distal pulses.    Pulmonary/Chest: Effort normal. No respiratory distress. She exhibits no tenderness.   Abdominal: Soft. She exhibits no distension. There is no abdominal tenderness.   Neurological: She is alert and oriented to person, place, and time. She has normal reflexes. She displays normal reflexes. No cranial nerve deficit. She exhibits normal muscle tone.   Psychiatric: She has a normal mood and affect. Her behavior is normal. Judgment and thought content normal.         Right Shoulder Exam     Inspection/Observation   Swelling: present  Scapular Dyskinesia: positive    Tenderness   The patient is tender to palpation of the acromioclavicular joint, supraspinatus, greater tuberosity and biceps tendon.    Crepitus   The patient has crepitus of the AC joint and greater tuberosity.    Range of Motion   Active abduction: abnormal   Extension: abnormal   Forward Flexion: abnormal   Forward Elevation: abnormal  Adduction: abnormal    Tests & Signs   Cross arm: " positive  Drop arm: positive  Lyn test: positive  Impingement: positive  Sulcus: absent  Rotator Cuff Painful Arc/Range: moderate  Anterosuperior Escape: negative  Lag Sign 90 degrees: positive  Lift Off Sign: positive  Belly Press: positive  Bear Hug: positive  Jerk Test: negative    Other   Sensation: normal    Comments:  Patient demonstrates evidence of pseudoparalysis with limited active forward elevation    Left Shoulder Exam   Left shoulder exam is normal.      Muscle Strength   Right Upper Extremity   Supraspinatus: 3/5   Subscapularis: 3/5   Biceps: 4/5     Reflexes     Right Side   Right Escalante's Sign:  absent    Vascular Exam     Right Pulses      Radial:                    2+            IMAGING:  X-ray Shoulder 2 or More Views Right    Result Date: 4/21/2022  See Procedure Notes for results. IMPRESSION: Please see Ortho procedure notes for report.  This procedure was auto-finalized by: Virtual Radiologist  Radiographs right shoulder demonstrate a lateral downsloping acromion with moderate AC joint osteoarthritis and a type 3 acromion noted.  There is also sclerosis on the greater tuberosity.  X-Ray Shoulder 2 or More Views Left    Result Date: 4/21/2022  See Procedure Notes for results. IMPRESSION: Please see Ortho procedure notes for report.  This procedure was auto-finalized by: Virtual Radiologist    Left shoulder radiographs demonstrate a lateral downsloping acromion moderate AC joint osteoarthritis and no glenohumeral joint osteoarthritis    Right shoulder MRI demonstrates a full-thickness and retracted tear of the supraspinatus and infraspinatus retracted to the mid glenoid level with fatty atrophy consistent with grade 2 atrophy.  There is arthritis in the acromioclavicular joint degenerative SLAP tear noted.  Left shoulder MRI was also reviewed demonstrating the presence of a partial-thickness supraspinatus tear.  ASSESSMENT:      ICD-10-CM ICD-9-CM   1. Left shoulder pain, unspecified  chronicity  M25.512 719.41       PLAN:     -Findings and treatment options were discussed with the patient  -All questions answered  Schedule and consent for right shoulder arthroscopy with SAD, DCE, RCR, biceps tenodesis    MRI findings were reviewed with the patient.  The patient has a symptomatic full-thickness rotator cuff tear with associated impingement findings.  Muscle quality is well maintained.  Treatment options were reviewed to include both operative and nonoperative measures.  The patient has failed an initial course of conservative treatment.  Given the extent and duration of the patient's complaints, operative intervention is certainly reasonable at this point.  The details of arthroscopic rotator cuff repair with subacromial decompression, biceps tenotomy versus tenodesis, labral debridement, distal clavicle excision, and possible Regeneten patch augmentation were discussed.    The patient understands the likely convalescence after surgery, in particular the expected postop rehab and recovery course. Alternatives both operative and non-operative with associated risks and benefits discussed. The outlined risks and potential complications of the proposed procedure include but are not limited to: infection, poor wound healing, scarring, stiffness, swelling, continued or recurrent pain, instability, hardware or prosthetic failure, symptomatic hardware requiring removal, weakness, neurovascular injury, numbness, chronic regional pain disorder, tissue nonhealing/irreparability/retear, contralateral ligament injury, chondral injury, arthritis, fracture, blood clot formation, inability to return to previous level of activity, anesthetic or regional block complication up to death, need for additional procedure as indicated, and potential need for further surgery.     she was also informed and understands the risks of surgery are greater for patients with a current condition or history of heart disease,  obesity, clotting disorders, recurrent infections, steroid use, current or past smoking, and factors such as sedentary lifestyle and noncompliance with medications, therapy or follow-up. The degree of the increased risk is hard to estimate with any degree of precision.    All questions were answered. The patient has verbalized understanding of these issues and wishes to proceed as discussed. The patient understands that recovery time can be up to 6-12 months.      There are no Patient Instructions on file for this visit.  Orders Placed This Encounter   Procedures    X-Ray Shoulder 2 or More Views Left     Standing Status:   Future     Number of Occurrences:   1     Standing Expiration Date:   4/21/2023     Order Specific Question:   May the Radiologist modify the order per protocol to meet the clinical needs of the patient?     Answer:   Yes     Order Specific Question:   Release to patient     Answer:   Immediate     Procedures

## 2022-04-21 NOTE — PROGRESS NOTES
Alejandra Gan MD        PATIENT NAME: Sophia Kc  : 1965  DATE: 22  MRN: [id number]      Billing Provider: Alejandra Gan MD  Level of Service:   Patient PCP Information     Provider PCP Type    Alejandra Gan MD General          Reason for Visit / Chief Complaint: Annual Exam (Blood pressure been reading 186/96 for about a month; she's been taking two pills to try to get it down. The bottom number been higher than the reading today)       History of Present Illness      Sophia Kc presents to the clinic with Annual Exam (Blood pressure been reading 186/96 for about a month; she's been taking two pills to try to get it down. The bottom number been higher than the reading today)     BP has been going up for the last month, she lost her so in an accident about a month ago, she does not feel depress, but she is grieving, she tried taking atenolol 100mg with no changes in BP noted at home      Review of Systems     Review of Systems   Constitutional: Negative for activity change and unexpected weight change.   HENT: Negative for hearing loss, rhinorrhea and trouble swallowing.    Eyes: Negative for discharge and visual disturbance.   Respiratory: Negative for chest tightness and wheezing.    Cardiovascular: Negative for chest pain and palpitations.   Gastrointestinal: Negative for blood in stool, constipation, diarrhea and vomiting.   Endocrine: Negative for polydipsia and polyuria.   Genitourinary: Negative for difficulty urinating, dysuria, hematuria and menstrual problem.   Musculoskeletal: Negative for arthralgias, joint swelling and neck pain.   Neurological: Negative for weakness and headaches.   Psychiatric/Behavioral: Positive for dysphoric mood. Negative for confusion.       Medical / Social / Family History     Past Medical History:   Diagnosis Date    Hx of colonoscopy 10/05/2021    negative    Hypertension        Past Surgical History:   Procedure Laterality Date    HYSTERECTOMY    "      Social History  Ms.  reports that she has never smoked. She has never used smokeless tobacco. She reports current alcohol use. She reports that she does not use drugs.    Family History  Ms.'s family history includes Breast cancer in her maternal aunt.    Medications and Allergies     Medications  Outpatient Medications Marked as Taking for the 4/21/22 encounter (Office Visit) with Alejandra Gan MD   Medication Sig Dispense Refill    gabapentin (NEURONTIN) 100 MG capsule Take 1 capsule (100 mg total) by mouth every evening. 30 capsule 11    [DISCONTINUED] atenoloL (TENORMIN) 50 MG tablet Take 1 tablet (50 mg total) by mouth once daily. 90 tablet 3       Allergies  Review of patient's allergies indicates:   Allergen Reactions    Lisinopril        Physical Examination   BP (!) 186/96   Pulse 63   Temp 97.7 °F (36.5 °C) (Temporal)   Resp 20   Ht 5' 1" (1.549 m)   Wt 90.7 kg (200 lb)   SpO2 99%   BMI 37.79 kg/m²     Physical Exam  Constitutional:       Appearance: Normal appearance. She is obese.   Cardiovascular:      Rate and Rhythm: Normal rate and regular rhythm.      Pulses: Normal pulses.      Heart sounds: Normal heart sounds.   Pulmonary:      Effort: Pulmonary effort is normal.      Breath sounds: Normal breath sounds.   Musculoskeletal:         General: Normal range of motion.   Skin:     General: Skin is warm.   Neurological:      General: No focal deficit present.      Mental Status: She is alert.   Psychiatric:         Mood and Affect: Mood normal.         Behavior: Behavior normal.         Judgment: Judgment normal.         Assessment and Plan (including Health Maintenance)     Plan:         Problem List Items Addressed This Visit        Cardiac/Vascular    Essential hypertension - Primary    Relevant Medications    atenoloL-chlorthalidone (TENORETIC) 50-25 mg Tab    potassium chloride (MICRO-K) 10 MEQ CpSR    Other Relevant Orders    Lipid Panel    Comprehensive Metabolic Panel       " Endocrine    Impaired fasting glucose    Relevant Orders    Hemoglobin A1C      Other Visit Diagnoses     Encounter for hepatitis C screening test for low risk patient        Relevant Orders    Hepatitis C Antibody    Screening for HIV (human immunodeficiency virus)        Relevant Orders    HIV 1/2 Ag/Ab (4th Gen)    Need for vaccination        Relevant Orders    (In Office Administered) Tdap Vaccine (Completed)      - work on diet, specifically cutting back bread, breaded things, cereal, pasta, potatoes, rice  - try to exercise at least 150min a week divided however you want  - if you can do weight, even very light weight do them  - try not to use to much salt, if any, remember that things that are preserved or frozen often contain large amounts of salt as a perseve  - she will be going for surgical intervention of rotator cuff in 2 weeks      Follow up in about 6 months (around 10/21/2022).        Signature:  Alejandra Gan MD      Date of encounter: 4/21/22

## 2022-04-22 LAB
EST. AVERAGE GLUCOSE BLD GHB EST-MCNC: 107 MG/DL
HBA1C MFR BLD HPLC: 5.8 % (ref 4.5–6.6)
HCV AB SER QL: NORMAL

## 2022-04-25 ENCOUNTER — PATIENT MESSAGE (OUTPATIENT)
Dept: FAMILY MEDICINE | Facility: CLINIC | Age: 57
End: 2022-04-25
Payer: COMMERCIAL

## 2022-04-25 DIAGNOSIS — M75.101 ROTATOR CUFF TEAR ARTHROPATHY OF RIGHT SHOULDER: Primary | ICD-10-CM

## 2022-04-25 DIAGNOSIS — Z01.818 PREPROCEDURAL EXAMINATION: Primary | ICD-10-CM

## 2022-04-25 DIAGNOSIS — M12.811 ROTATOR CUFF TEAR ARTHROPATHY OF RIGHT SHOULDER: Primary | ICD-10-CM

## 2022-04-25 RX ORDER — MUPIROCIN 20 MG/G
OINTMENT TOPICAL
Status: CANCELLED | OUTPATIENT
Start: 2022-04-25

## 2022-04-25 RX ORDER — SODIUM CHLORIDE 9 MG/ML
INJECTION, SOLUTION INTRAVENOUS CONTINUOUS
Status: CANCELLED | OUTPATIENT
Start: 2022-04-25

## 2022-09-26 ENCOUNTER — OFFICE VISIT (OUTPATIENT)
Dept: FAMILY MEDICINE | Facility: CLINIC | Age: 57
End: 2022-09-26
Payer: COMMERCIAL

## 2022-09-26 VITALS
OXYGEN SATURATION: 98 % | TEMPERATURE: 98 F | RESPIRATION RATE: 18 BRPM | HEART RATE: 78 BPM | DIASTOLIC BLOOD PRESSURE: 87 MMHG | BODY MASS INDEX: 37.5 KG/M2 | SYSTOLIC BLOOD PRESSURE: 130 MMHG | WEIGHT: 198.63 LBS | HEIGHT: 61 IN

## 2022-09-26 DIAGNOSIS — Z00.01 ENCOUNTER FOR GENERAL ADULT MEDICAL EXAMINATION WITH ABNORMAL FINDINGS: Primary | ICD-10-CM

## 2022-09-26 DIAGNOSIS — I10 ESSENTIAL HYPERTENSION: ICD-10-CM

## 2022-09-26 DIAGNOSIS — Z13.1 SCREENING FOR DIABETES MELLITUS: ICD-10-CM

## 2022-09-26 DIAGNOSIS — R73.01 IMPAIRED FASTING GLUCOSE: ICD-10-CM

## 2022-09-26 DIAGNOSIS — Z13.220 SCREENING FOR LIPOID DISORDERS: ICD-10-CM

## 2022-09-26 DIAGNOSIS — E66.9 OBESITY, UNSPECIFIED CLASSIFICATION, UNSPECIFIED OBESITY TYPE, UNSPECIFIED WHETHER SERIOUS COMORBIDITY PRESENT: ICD-10-CM

## 2022-09-26 DIAGNOSIS — M75.102 TEAR OF LEFT SUPRASPINATUS TENDON: ICD-10-CM

## 2022-09-26 DIAGNOSIS — Z12.39 ENCOUNTER FOR SCREENING FOR MALIGNANT NEOPLASM OF BREAST, UNSPECIFIED SCREENING MODALITY: ICD-10-CM

## 2022-09-26 LAB
CHOLEST SERPL-MCNC: 170 MG/DL (ref 0–200)
CHOLEST/HDLC SERPL: 3.3 {RATIO}
EST. AVERAGE GLUCOSE BLD GHB EST-MCNC: 117 MG/DL
GLUCOSE SERPL-MCNC: 160 MG/DL (ref 74–106)
HBA1C MFR BLD HPLC: 6.1 % (ref 4.5–6.6)
HDLC SERPL-MCNC: 51 MG/DL (ref 40–60)
LDLC SERPL CALC-MCNC: 78 MG/DL
LDLC/HDLC SERPL: 1.5 {RATIO}
NONHDLC SERPL-MCNC: 119 MG/DL
TRIGL SERPL-MCNC: 205 MG/DL (ref 35–150)
VLDLC SERPL-MCNC: 41 MG/DL

## 2022-09-26 PROCEDURE — 3044F PR MOST RECENT HEMOGLOBIN A1C LEVEL <7.0%: ICD-10-PCS | Mod: ,,, | Performed by: FAMILY MEDICINE

## 2022-09-26 PROCEDURE — 1160F PR REVIEW ALL MEDS BY PRESCRIBER/CLIN PHARMACIST DOCUMENTED: ICD-10-PCS | Mod: ,,, | Performed by: FAMILY MEDICINE

## 2022-09-26 PROCEDURE — 3075F PR MOST RECENT SYSTOLIC BLOOD PRESS GE 130-139MM HG: ICD-10-PCS | Mod: ,,, | Performed by: FAMILY MEDICINE

## 2022-09-26 PROCEDURE — 1160F RVW MEDS BY RX/DR IN RCRD: CPT | Mod: ,,, | Performed by: FAMILY MEDICINE

## 2022-09-26 PROCEDURE — 3008F PR BODY MASS INDEX (BMI) DOCUMENTED: ICD-10-PCS | Mod: ,,, | Performed by: FAMILY MEDICINE

## 2022-09-26 PROCEDURE — 3079F PR MOST RECENT DIASTOLIC BLOOD PRESSURE 80-89 MM HG: ICD-10-PCS | Mod: ,,, | Performed by: FAMILY MEDICINE

## 2022-09-26 PROCEDURE — 3079F DIAST BP 80-89 MM HG: CPT | Mod: ,,, | Performed by: FAMILY MEDICINE

## 2022-09-26 PROCEDURE — 99396 PR PREVENTIVE VISIT,EST,40-64: ICD-10-PCS | Mod: 25,,, | Performed by: FAMILY MEDICINE

## 2022-09-26 PROCEDURE — 83036 HEMOGLOBIN GLYCOSYLATED A1C: CPT | Mod: ,,, | Performed by: CLINICAL MEDICAL LABORATORY

## 2022-09-26 PROCEDURE — 82947 GLUCOSE, FASTING: ICD-10-PCS | Mod: ,,, | Performed by: CLINICAL MEDICAL LABORATORY

## 2022-09-26 PROCEDURE — 3008F BODY MASS INDEX DOCD: CPT | Mod: ,,, | Performed by: FAMILY MEDICINE

## 2022-09-26 PROCEDURE — 96372 THER/PROPH/DIAG INJ SC/IM: CPT | Mod: ,,, | Performed by: FAMILY MEDICINE

## 2022-09-26 PROCEDURE — 3075F SYST BP GE 130 - 139MM HG: CPT | Mod: ,,, | Performed by: FAMILY MEDICINE

## 2022-09-26 PROCEDURE — 83036 HEMOGLOBIN A1C: ICD-10-PCS | Mod: ,,, | Performed by: CLINICAL MEDICAL LABORATORY

## 2022-09-26 PROCEDURE — 80061 LIPID PANEL: CPT | Mod: ,,, | Performed by: CLINICAL MEDICAL LABORATORY

## 2022-09-26 PROCEDURE — 82947 ASSAY GLUCOSE BLOOD QUANT: CPT | Mod: ,,, | Performed by: CLINICAL MEDICAL LABORATORY

## 2022-09-26 PROCEDURE — 1159F MED LIST DOCD IN RCRD: CPT | Mod: ,,, | Performed by: FAMILY MEDICINE

## 2022-09-26 PROCEDURE — 1159F PR MEDICATION LIST DOCUMENTED IN MEDICAL RECORD: ICD-10-PCS | Mod: ,,, | Performed by: FAMILY MEDICINE

## 2022-09-26 PROCEDURE — 80061 LIPID PANEL: ICD-10-PCS | Mod: ,,, | Performed by: CLINICAL MEDICAL LABORATORY

## 2022-09-26 PROCEDURE — 99396 PREV VISIT EST AGE 40-64: CPT | Mod: 25,,, | Performed by: FAMILY MEDICINE

## 2022-09-26 PROCEDURE — 3044F HG A1C LEVEL LT 7.0%: CPT | Mod: ,,, | Performed by: FAMILY MEDICINE

## 2022-09-26 PROCEDURE — 96372 PR INJECTION,THERAP/PROPH/DIAG2ST, IM OR SUBCUT: ICD-10-PCS | Mod: ,,, | Performed by: FAMILY MEDICINE

## 2022-09-26 RX ORDER — CYCLOBENZAPRINE HCL 10 MG
10 TABLET ORAL 3 TIMES DAILY PRN
Qty: 30 TABLET | Refills: 0 | Status: SHIPPED | OUTPATIENT
Start: 2022-09-26 | End: 2022-10-06

## 2022-09-26 RX ORDER — TRIAMCINOLONE ACETONIDE 40 MG/ML
40 INJECTION, SUSPENSION INTRA-ARTICULAR; INTRAMUSCULAR ONCE
Status: COMPLETED | OUTPATIENT
Start: 2022-09-26 | End: 2022-09-26

## 2022-09-26 RX ADMIN — TRIAMCINOLONE ACETONIDE 40 MG: 40 INJECTION, SUSPENSION INTRA-ARTICULAR; INTRAMUSCULAR at 11:09

## 2022-09-26 NOTE — PROGRESS NOTES
Subjective     Patient ID: Sophia Kc is a 57 y.o. female.    Chief Complaint: Healthy You    Right shoulder pain chronic, was unable to get surgical intervention, the shot I have her last October did help, would like to know if I can give her another one     Would like to loose weight    She does not need refills at this time    She has multiple  aches and pain which is worsen due to increase weight       dental visits discussed    sun screen use discussed, use of helmets and seat belts discussed  Gun safety discussed  Sleep discussed  Diet and exercise discussed          Review of Systems   Constitutional:  Negative for activity change and fatigue.   Respiratory:  Negative for shortness of breath.    Cardiovascular:  Negative for chest pain, palpitations and leg swelling.   Gastrointestinal:  Negative for change in bowel habit, constipation and change in bowel habit.   Endocrine: Negative for polydipsia, polyphagia and polyuria.   Musculoskeletal:  Negative for gait problem, neck pain and neck stiffness.   Integumentary:  Negative for rash.   Psychiatric/Behavioral:  Negative for behavioral problems and suicidal ideas.      Tobacco Use: Low Risk     Smoking Tobacco Use: Never    Smokeless Tobacco Use: Never    Passive Exposure: Not on file     Review of patient's allergies indicates:   Allergen Reactions    Lisinopril      Current Outpatient Medications   Medication Instructions    atenoloL-chlorthalidone (TENORETIC) 50-25 mg Tab 1 tablet, Oral, Daily    cyclobenzaprine (FLEXERIL) 10 mg, Oral, 3 times daily PRN    gabapentin (NEURONTIN) 100 mg, Oral, Nightly    potassium chloride (MICRO-K) 10 MEQ CpSR 10 mEq, Oral, Daily     There are no discontinued medications.    Past Medical History:   Diagnosis Date    Hx of colonoscopy 10/05/2021    negative    Hypertension      Health Maintenance Topics with due status: Not Due       Topic Last Completion Date    TETANUS VACCINE 04/21/2022    Lipid Panel 04/21/2022  "    Immunization History   Administered Date(s) Administered    COVID-19, MRNA, LN-S, PF (MODERNA FULL 0.5 ML DOSE) 02/09/2022, 03/14/2022    Tdap 04/21/2022       Objective     Body mass index is 37.53 kg/m².  Wt Readings from Last 3 Encounters:   09/26/22 90.1 kg (198 lb 9.6 oz)   04/21/22 90.7 kg (200 lb)   04/21/22 91.6 kg (202 lb)     Ht Readings from Last 3 Encounters:   09/26/22 5' 1" (1.549 m)   04/21/22 5' 1" (1.549 m)   04/21/22 5' 1" (1.549 m)     BP Readings from Last 3 Encounters:   09/26/22 130/87   04/21/22 (!) 186/96   01/28/22 124/70     Temp Readings from Last 3 Encounters:   09/26/22 97.9 °F (36.6 °C) (Oral)   04/21/22 97.7 °F (36.5 °C) (Temporal)   01/28/22 98.8 °F (37.1 °C)     Pulse Readings from Last 3 Encounters:   09/26/22 78   04/21/22 63   01/28/22 71     Resp Readings from Last 3 Encounters:   09/26/22 18   04/21/22 20   01/28/22 18     PF Readings from Last 3 Encounters:   No data found for PF       Physical Exam  Constitutional:       Appearance: Normal appearance. She is obese. She is not ill-appearing.   Cardiovascular:      Rate and Rhythm: Normal rate and regular rhythm.   Pulmonary:      Effort: Pulmonary effort is normal.      Breath sounds: Normal breath sounds.   Musculoskeletal:      Right shoulder: Tenderness present. Decreased range of motion. Decreased strength.   Neurological:      General: No focal deficit present.      Mental Status: She is alert and oriented to person, place, and time.   Psychiatric:         Mood and Affect: Mood normal.         Behavior: Behavior normal.         Judgment: Judgment normal.     Procedure: Shoulder injection  Performed by Dr. Gan  Reason for procedure:  After informed consent was obtained, A Time out was performed with patient.  Area was cleansed with rubbing alcohol in sterile fashion. Topical anesthesia was then achieved using Gebaur's Ethyl Chloride. The right shoulder joint was then injected using a posterior approach. After " initially entering joint, plunger was withdrawn to prevent entry into blood vessel. After appropriate anatomical location was confirmed, 3mL of 1%Lidocaine w/o Epi and 1mL of Kenalog (40mg) with 3 mL of 0.25% marcaine was injected into the joint. The needle was withdrawn and direct pressure was applied over the site. The joint was then mobilized into an abbreviated arc of motion and pt was advised to stay seated to prevent any falls after injection. Pt tolerated procedure well and bandage was placed over injection site.    After care instruction given      Assessment and Plan     Problem List Items Addressed This Visit          Cardiac/Vascular    Essential hypertension       Endocrine    Impaired fasting glucose    Relevant Orders    Ambulatory referral/consult to Diabetes Education (Education Only)     Other Visit Diagnoses       Encounter for general adult medical examination with abnormal findings    -  Primary    Screening for diabetes mellitus        Relevant Orders    Hemoglobin A1C    Glucose, Fasting    Screening for lipoid disorders        Relevant Orders    Lipid Panel    Encounter for screening for malignant neoplasm of breast, unspecified screening modality        Relevant Orders    Mammo Digital Screening Bilat    Tear of left supraspinatus tendon        Relevant Medications    triamcinolone acetonide injection 40 mg (Completed) (Start on 9/26/2022 12:00 PM)    Other Relevant Orders    Ambulatory referral/consult to Physical/Occupational Therapy    Obesity, unspecified classification, unspecified obesity type, unspecified whether serious comorbidity present        BMI 37.0-37.9, adult                  Plan:   she is going to try to improve healthy choices, reduce calorie intake, try to do more physical activity to lose weight. Recommended at least 150 minutes a week with resistance training as tolerated  Monitor weight  attend regularly scheduled apts   Schedule yearly eye exam  Take medications as  prescirved  Improve nutrition, decrease car intake, decrease fast food  Stay away from tobacco products  Sun screen recommended and discuseed        I have reviewed the medications, allergies, and problem list.     Goal Actions:    What type of visit is the patient here for today?: Healthy You  Does the patient consent to enroll in Color Me Healthy?: Yes  Is this a Wellness Follow Up?: No  What is your overall wellness goal? (select at least one): Improve overall health  Choose 3: Biometric, Lifestyle, Nutrition  Biometric Actions: Attend regularly scheduled office visits  Lifestyle Actions : Obtain yearly mammogram  Nurtrition Actions: Avoid adding table salt      Alejandra Gan MD

## 2022-09-26 NOTE — PATIENT INSTRUCTIONS
"Patient Education       High Cholesterol   The Basics   Written by the doctors and editors at [facility]   What is cholesterol? -- Cholesterol is a substance that is found in the blood. Everyone has some. It is needed for good health. The problem is, people sometimes have too much cholesterol. Compared with people with normal cholesterol, people with high cholesterol have a higher risk of heart attacks, strokes, and other health problems. The higher your cholesterol, the higher your risk of these problems.  Are there different types of cholesterol? -- Yes, there are a few different types. If you get a cholesterol test, you might hear your doctor or nurse talk about:  Total cholesterol  LDL cholesterol - Some people call this the "bad" cholesterol. That's because having high LDL levels raises your risk of heart attacks, strokes, and other health problems.  HDL cholesterol - Some people call this the "good" cholesterol. That's because people with high HDL levels tend to have a lower risk of heart attacks, strokes, and other health problems.   Non-HDL cholesterol - Non-HDL cholesterol is your total cholesterol minus your HDL cholesterol.  Triglycerides - Triglycerides are not cholesterol. They are another type of fat. But they often get measured when cholesterol is measured. (Having high triglycerides also seems to increase the risk of heart attacks and strokes.)  What should my numbers be? -- Ask your doctor or nurse what your numbers should be. Different people need different goals. (If you live outside the [place], see the table (table 1)). In general, people who do not already have heart disease should aim for:  Total cholesterol below 200  LDL cholesterol below 130 - or much lower, if they are at risk of heart attacks or strokes  HDL cholesterol above 60  Non-HDL cholesterol below 160 - or lower, if they are at risk of heart attacks or strokes  Triglycerides below 150  Keep in mind, though, that many people " "who cannot meet these goals still have a low risk of heart attacks and strokes.  What should I do if my doctor tells me I have high cholesterol? -- Ask your doctor what your overall risk of heart attacks and strokes is. High cholesterol, by itself, is not always a reason to worry. Having high cholesterol is just one of many things that can increase your risk of heart attacks and strokes. Other factors that increase your risk include:  Cigarette smoking  High blood pressure  Having a parent, sister, or brother who got heart disease at a young age - Young, in this case, means younger than 55 for men and younger than 65 for women.  A diet that is not heart healthy - A "heart-healthy" diet includes lots of fruits and vegetables, fiber, and healthy fats (like those found in fish and certain oils). It also means limiting sugar and unhealthy fats.  Older age  If you are at high risk of heart attacks and strokes, having high cholesterol is a problem. On the other hand, if you are at low risk, having high cholesterol might not lead to treatment.  Should I take medicine to lower cholesterol? -- Not everyone who has high cholesterol needs medicines. Your doctor or nurse will decide if you need them based on your age, family history, and other health concerns.  You should probably take a cholesterol-lowering medicine called a statin if you:  Already had a heart attack or stroke  Have known heart disease  Have diabetes  Have a condition called peripheral artery disease, which makes it painful to walk, and happens when the arteries in your legs get clogged with fatty deposits  Have an abdominal aortic aneurysm, which is a widening of the main artery in the belly  Most people with any of the conditions listed above should take a statin no matter what their cholesterol level is. If your doctor or nurse puts you on a statin, stay on it. The medicine might not make you feel any different. But it can help prevent heart attacks, " strokes, and death.  Can I lower my cholesterol without medicines? -- Yes, you can lower your cholesterol some by:  Avoiding red meat, butter, fried foods, cheese, and other foods that have a lot of saturated fat  Losing weight (if you are overweight)  Being more active  Even if these steps do little to change your cholesterol, they can improve your health in many ways.  All topics are updated as new evidence becomes available and our peer review process is complete.  This topic retrieved from [employer] on: Sep 21, 2021.  Topic 22963 Version 19.0  Release: 29.4.2 - C29.263  © 2021 UpToDate, Inc. and/or its affiliates. All rights reserved.  table 1: Cholesterol and triglyceride measurements in the [place] and elsewhere     Measurement used within the [place] Milligrams/deciliter (mg/dL)  Measurement used most places outside the [place] Millimoles/liter (mmol/Liter)     Level to aim for  Level to aim for    Total cholesterol  Below 200 Below 5.17   LDL cholesterol  Below 130 - or much lower if at risk of heart attack and stroke Below 3.36 - or much lower if at risk of heart attack and stroke   HDL cholesterol  Above 60 Above 1.55   Triglycerides  Below 150 Below 1.7   Cholesterol is measured differently in the [place] than it is in most other countries. This table shows values used within and outside the [place]. It includes the cholesterol and triglyceride levels that most people who do not have heart disease should aim for.  Graphic 61698 Version 3.0  Consumer Information Use and Disclaimer   This information is not specific medical advice and does not replace information you receive from your health care provider. This is only a brief summary of general information. It does NOT include all information about conditions, illnesses, injuries, tests, procedures, treatments, therapies, discharge instructions or life-style choices that may apply to you. You must talk with your health care  provider for complete information about your health and treatment options. This information should not be used to decide whether or not to accept your health care provider's advice, instructions or recommendations. Only your health care provider has the knowledge and training to provide advice that is right for you. The use of this information is governed by the [employer] End User License Agreement, available at https://www.[employer]/en/solutions/Astrid/about/luc.The use of [employer] content is governed by the [employer] Terms of Use. ©2021 UpToDate, Inc. All rights reserved.  Copyright   © 2021 UpToDate, Inc. and/or its affiliates. All rights reserved.    Patient Education       Diet and Health   The Basics   Written by the doctors and editors at [facility]   Why is it important to eat a healthy diet? -- It's important to eat a healthy diet because eating the right foods can keep you healthy now and later on in life.  Which foods are especially healthy? -- Foods that are especially healthy include:  Fruits and vegetables - Eating a diet with lots of fruits and vegetables can help prevent heart disease and strokes. It might also help prevent certain types of cancers. Try to eat fruits and vegetables at each meal and also for snacks. If you don't have fresh fruits and vegetables available, you can eat frozen or canned ones instead. Doctors recommend eating at least 2 1/2 servings of vegetables and 2 servings of fruits each day.  Foods with fiber - Eating foods with a lot of fiber can help prevent heart disease and strokes. If you have type 2 diabetes, it can also help control your blood sugar. Foods that have a lot of fiber include vegetables, fruits, beans, nuts, oatmeal, and whole grain breads and cereals. You can tell how much fiber is in a food by reading the nutrition label (figure 1). Doctors recommend eating 25 to 36 grams of fiber each day.  Foods with folate (also called folic acid) - Folate is a  "vitamin that is important for pregnant people, since it helps prevent certain birth defects. Anyone who could get pregnant should get at least 400 micrograms of folic acid daily, whether or not they are actively trying to get pregnant. Folate is found in many breakfast cereals, oranges, orange juice, and green leafy vegetables.  Foods with calcium and vitamin D - Babies, children, and adults need calcium and vitamin D to help keep their bones strong. Adults also need calcium and vitamin D to help prevent osteoporosis. Osteoporosis is a condition that causes bones to get "thin" and break more easily than usual. Different foods and drinks have calcium and vitamin D in them (figure 2). People who don't get enough calcium and vitamin D in their diet might need to take a supplement.  Foods with protein - Protein helps your muscles stay strong. Healthy foods with a lot of protein include chicken, fish, eggs, beans, nuts, and soy products. Red meat also has a lot of protein, but it also contains fats, which can be unhealthy.  Some experts recommend a "Mediterranean diet." This involves eating a lot of fruits, vegetables, nuts, whole grains, and olive oil. It also includes some fish, poultry, and dairy products, but not a lot of red meat. Eating this way can help your overall health, and might even lower your risk of having a stroke.  What foods should I avoid or limit? -- To eat a healthy diet, there are some things you should avoid or limit. They include:   Fats - There are different types of fats. Some types of fats are better for your body than others.  Trans fats are especially unhealthy. They are found in margarines, many fast foods, and some store-bought baked goods. Trans fats can raise your cholesterol level and your increase your chance of getting heart disease. You should avoid eating foods with these types of fats.  The type of "polyunsaturated" fats found in fish seems to be healthy and can reduce your chance " "of getting heart disease. Other polyunsaturated fats might also be good for your health. When you cook, it's best to use oils with healthier fats, such as olive oil and canola oil.  Sugar - To have a healthy diet, it's important to limit or avoid sugar, sweets, and refined grains. Refined grains are found in white bread, white rice, most forms of pasta, and most packaged "snack" foods. Whole grains, such as whole-wheat bread and brown rice, have more fiber and are better for your health.  Avoiding sugar-sweetened beverages, like soda and sports drinks, can also help improve your health.  Red meat - Studies have shown that eating a lot of red meat can increase your risk of certain health problems, including heart disease and cancer. You should limit the amount of red meat that you eat.  Can I drink alcohol as part of a healthy diet? -- People who drink a small amount of alcohol each day might have a lower chance of getting heart disease. But drinking alcohol can lead to problems. For example, it can raise a person's chances of getting liver disease and certain types of cancers. In women, even 1 drink a day can increase the risk of getting breast cancer.  Most doctors recommend that adult women not have more than 1 drink a day and that adult men not have more than 2 drinks a day. The limits are different because most women's bodies take longer to break down alcohol.  How many calories do I need each day? -- The number of calories you need each day depends on your weight, height, age, sex, and how active you are.  Your doctor or nurse can tell you how many calories you should eat each day. If you are trying to lose weight, you should eat fewer calories each day.  What if I have questions? -- If you have questions about which foods you should or should not eat, ask your doctor or nurse. The right diet for you will depend, in part, on your health and any medical conditions you have.  All topics are updated as new " "evidence becomes available and our peer review process is complete.  This topic retrieved from [employer] on: Sep 21, 2021.  Topic 20703 Version 20.0  Release: 29.4.2 - C29.263  © 2021 UpToDate, Inc. and/or its affiliates. All rights reserved.  figure 1: Nutrition label - fiber     This is an example of a nutrition label. To figure out how much fiber is in a food, look for the line that says "Dietary Fiber." It's also important to look at the serving size. This food has 7 grams of fiber in each serving, and each serving is 1 cup.  Graphic 52091 Version 7.0    figure 2: Foods and drinks with calcium and vitamin D     Foods rich in calcium include ice cream, soy milk, breads, kale, broccoli, milk, cheese, cottage cheese, almonds, yogurt, ready-to-eat cereals, beans, and tofu. Foods rich in vitamin D include milk, fortified plant-based "milks" (soy, almond), canned tuna fish, cod liver oil, yogurt, ready-to-eat-cereals, cooked salmon, canned sardines, mackerel, and eggs. Some of these foods are rich in both.  Graphic 98546 Version 4.0    Consumer Information Use and Disclaimer   This information is not specific medical advice and does not replace information you receive from your health care provider. This is only a brief summary of general information. It does NOT include all information about conditions, illnesses, injuries, tests, procedures, treatments, therapies, discharge instructions or life-style choices that may apply to you. You must talk with your health care provider for complete information about your health and treatment options. This information should not be used to decide whether or not to accept your health care provider's advice, instructions or recommendations. Only your health care provider has the knowledge and training to provide advice that is right for you. The use of this information is governed by the [employer] End User License Agreement, available at " https://www.[employer].com/en/solutions/lexicomp/about/luc.The use of [employer] content is governed by the [employer] Terms of Use. ©2021 [employer]. All rights reserved.  Copyright   © 2021 UpToDate, Inc. and/or its affiliates. All rights reserved.

## 2022-10-06 ENCOUNTER — CLINICAL SUPPORT (OUTPATIENT)
Dept: REHABILITATION | Facility: HOSPITAL | Age: 57
End: 2022-10-06
Payer: COMMERCIAL

## 2022-10-06 DIAGNOSIS — M75.102 TEAR OF LEFT SUPRASPINATUS TENDON: ICD-10-CM

## 2022-10-06 DIAGNOSIS — M75.101 TEAR OF RIGHT SUPRASPINATUS TENDON: Primary | ICD-10-CM

## 2022-10-06 DIAGNOSIS — M25.611 DECREASED ROM OF RIGHT SHOULDER: ICD-10-CM

## 2022-10-06 PROCEDURE — 97161 PT EVAL LOW COMPLEX 20 MIN: CPT | Mod: PN

## 2022-10-06 PROCEDURE — 97110 THERAPEUTIC EXERCISES: CPT | Mod: PN

## 2022-10-06 NOTE — PLAN OF CARE
[facility]   Physical Therapy Initial Evaluation    Date: 10/6/2022   Name: Sophia Kc  Clinic Number: [id number]    Therapy Diagnosis:   Encounter Diagnoses   Name Primary?    Tear of left supraspinatus tendon     Tear of right supraspinatus tendon Yes    Decreased ROM of right shoulder      Physician: Alejandra Gan MD    Physician Orders: PT Eval and Treat    Medical Diagnosis from Referral: bilateral shoulder rotator cuff tear , right worse than left   Evaluation Date: 10/6/2022  Updated Plan of Care Due : 11/5/2022  Authorization Period Expiration: [employer] 20 visits   Plan of Care Expiration: end of year   Visit # / Visits authorized: 1/ 20    Time In: 1100  Time Out: 1145  Total Appointment Time (timed & untimed codes): 45 minutes    Precautions: Standard    Subjective   Date of onset: pt states she has been having pain in bilateral shoulders for over a year . Pt states her dominant right shoulder Is worse than her left.  Pt has been told she needs right rotator cuff sx on the right with full thickness tear on the right supraspintous , partial , infraspintouse, and bicep tear.  Pt also has partial thickness tear left supraspinatous,  pt has pain that awakes her at night with the right side. Pt voices she cant afford sx and has injections with some improvement in range of motion and decreased pain.   History of current condition - Sophia reports: hx above.      Medical History:   Past Medical History:   Diagnosis Date    Hx of colonoscopy 10/05/2021    negative    Hypertension        Surgical History:   Sophia Kc  has a past surgical history that includes Hysterectomy.    Medications:   Sophia has a current medication list which includes the following prescription(s): atenolol-chlorthalidone, cyclobenzaprine, gabapentin, and potassium chloride.    Allergies:   Review of patient's allergies indicates:   Allergen Reactions    Lisinopril         Imaging, MRI studies:    Right shoulder MRI  demonstrates a full-thickness and retracted tear of the supraspinatus and infraspinatus retracted to the mid glenoid level with fatty atrophy consistent with grade 2 atrophy.  There is arthritis in the acromioclavicular joint degenerative SLAP tear noted.  Left shoulder MRI was also reviewed demonstrating the presence of a partial-thickness supraspinatus tear.    Prior Therapy: none   Social History:   lives alone  Occupation: [employer] , direct care worker   Prior Level of Function: pain free a year ago   Current Level of Function: limited range of motion and pain right shoulder.     Pain:  Current 5/10, worst 10/10, best 5/10   Location: right shoulder  bilateral shoulder   Description: Aching, Dull, Burning, Throbbing, Grabbing, and Tight  Aggravating Factors: Morning, Extension, Flexing, and Lifting  Easing Factors: nothing and rest    Patients goals: be able to dress pain free and work pain free.     Objective       Observation : poor scapular control , shoulder weakness.     Forward Head/Rounded Shoulders :  [x] Yes   [] No   Scapular Winging :  [x] Yes   [] No   Incision :    n/a     Comments :     Cervical Spine Clearing :wnl         Range of Motion/Strength :                  Left Extremity                                                                        Right Extremity     AROM   PROM  Strength                  Location   AROM    PROM   Strength    160   4   Shoulder    Flexion 140 170 3   135  4                      Abduction   125 170 3                       45  4                      ER in Adduction 45  3                              Thoracic 8  4                      Functional IR Lumbar 1  3   85 90 4                      ER in 90 Scaption 85 90 3                               Elbow         Flexion 140  4                           Extension 0  4                           Pron/Supination          Functional Impairments : impingement with bilateral rotator cuff tears            Rotator Cuff :     Right shoulder MRI demonstrates a full-thickness and retracted tear of the supraspinatus and infraspinatus retracted to the mid glenoid level with fatty atrophy consistent with grade 2 atrophy.  There is arthritis in the acromioclavicular joint degenerative SLAP tear noted.  Left shoulder MRI was also reviewed demonstrating the presence of a partial-thickness supraspinatus tear.    Labrum :    Elbow :     Other :    Palpation :  pain in bilateral supraspinatous, infraspinatous left, bicep right                      Limitation/Restriction for FOTO shoulder  Survey    Therapist reviewed FOTO scores for Sophia Kc on 10/6/2022.   FOTO documents entered into EPIC - see Media section.    Limitation Score: 51%         TREATMENT         Sophia received the treatments listed below:  THERAPEUTIC EXERCISES to develop strength, endurance, ROM, and flexibility for 20  minutes including home ex program         Home Exercises and Patient Education Provided    Education provided:   - home ex program     Written Home Exercises Provided: yes.  Exercises were reviewed and Sophia was able to demonstrate them prior to the end of the session.  Sophia demonstrated good  understanding of the education provided.     See EMR under Media for exercises provided 10/6/2022.    Assessment   Sophia is a 57 y.o. female referred to outpatient Physical Therapy with a medical diagnosis of bilateral rotator cuff tears . Patient presents with decreased range of motion and strength bilateral rotator cuff     Patient prognosis is Excellent.   Patientt will benefit from skilled outpatient Physical Therapy to address the deficits stated above and in the chart below, provide patient /family education, and to maximize patientt's level of independence.     Plan of care discussed with patient: Yes  Patient's spiritual, cultural and educational needs considered and patient is agreeable to the plan of care and goals as stated below:     Anticipated  Barriers for therapy: decreased range of motion and strength.  Goals:  Short Term Goals: 4 weeks   Pt will increase arom to 170 degrees flexion bilateral   Pt will increase strength to 3+/5 bilateral   Be able to internally rotate to t10 (improve less pain with dressing)  Not wake up at night with shoulder pain    Long Term Goals: 6 weeks   Pt will be able to work pain free  Dress pain free     Plan   Plan of care Certification: 10/6/2022 to 11/5/2022.    Outpatient Physical Therapy 2 times weekly for 4 weeks to include the following interventions: Neuromuscular Re-ed, Patient Education, Therapeutic Exercise, and modalities as needed.  .    Plan of care has been reestablished with Naye MARTINEZ and Paula MARTINEZ.       Elver Alexander, PT          I CERTIFY THE NEED FOR THESE SERVICES FURNISHED UNDER THIS PLAN OF TREATMENT AND WHILE UNDER MY CARE.    Physician's comments:      Physician's Signature: ___________________________________________________

## 2022-10-13 ENCOUNTER — CLINICAL SUPPORT (OUTPATIENT)
Dept: REHABILITATION | Facility: HOSPITAL | Age: 57
End: 2022-10-13
Payer: COMMERCIAL

## 2022-10-13 DIAGNOSIS — M75.102 TEAR OF LEFT SUPRASPINATUS TENDON: Primary | ICD-10-CM

## 2022-10-13 DIAGNOSIS — M75.101 TEAR OF RIGHT SUPRASPINATUS TENDON: ICD-10-CM

## 2022-10-13 DIAGNOSIS — M25.611 DECREASED ROM OF RIGHT SHOULDER: ICD-10-CM

## 2022-10-13 DIAGNOSIS — M25.612 DECREASED ROM OF LEFT SHOULDER: ICD-10-CM

## 2022-10-13 PROCEDURE — 97110 THERAPEUTIC EXERCISES: CPT | Mod: PN,CQ

## 2022-10-13 NOTE — PROGRESS NOTES
Physical Therapy Treatment Note     Name: Sophia Kc  Clinic Number: [id number]    Therapy Diagnosis: No diagnosis found.  Physician: Alejandra Gan MD    Visit Date: 10/13/2022  Physician Orders: PT Eval and Treat    Medical Diagnosis from Referral: bilateral shoulder rotator cuff tear , right worse than left   Evaluation Date: 10/6/2022  Updated Plan of Care Due : 11/5/2022  Authorization Period Expiration: [employer] 20 visits   Plan of Care Expiration: end of year   Visit # / Visits authorized: 2/ 20  PTA Visit #: 1    Time In: 1052  Time Out: 1125  Total Billable Time: 33 minutes    Precautions: Standard  Plan of care reviewed with Elver Alexander PT.      Subjective     Pt reports: I can tell the exercises are helping some  She was compliant with home exercise program.  Response to previous treatment: sore    Pain: 5/10  Location: bilateral shoulders    Objective     Sophia received therapeutic exercises to develop strength, endurance, ROM, and flexibility for 25 minutes including:    Upper body ergometer x 5'  pulleys x 4'  Scapular retraction blue x 10  Bilateral red theraband external rotation, internal rotation, flexion and extension x 10  Supine flexion with dowel x 10  Bilateral prone pulls, extension and horizontal abduction x 10     Range of motion   Shoulder flexion     right   150  /172         left   163  / 170  Abduction              right   160    /170         left   160  /168  External rotation     right   73/78       left 75/80    Sophia received the following manual therapy techniques: passive range of motion were applied to the: bilateral shoulders for 8 minutes.      Home Exercises Provided and Patient Education Provided     Education provided: home exercise program     Written Home Exercises Provided: Patient instructed to cont prior HEP.  Exercises were reviewed and Sophia was able to demonstrate them prior to the end of the session.  Sophia demonstrated good  understanding of the  education provided.     See EMR under Patient Instructions for exercises provided prior visit.    Assessment     Patient had improved range of motion in all planes this treatment   Sophia Is progressing well towards her goals.   Pt prognosis is Fair.     Pt will continue to benefit from skilled outpatient physical therapy to address the deficits listed in the problem list box on initial evaluation, provide pt/family education and to maximize pt's level of independence in the home and community environment.     Pt's spiritual, cultural and educational needs considered and pt agreeable to plan of care and goals.     Anticipated barriers to physical therapy: compliance with home exercise program     Goals:    Short Term Goals: 4 weeks   Pt will increase arom to 170 degrees flexion bilateral   Pt will increase strength to 3+/5 bilateral   Be able to internally rotate to t10 (improve less pain with dressing)  Not wake up at night with shoulder pain     Long Term Goals: 6 weeks   Pt will be able to work pain free  Dress pain free   Plan     Plan of care Certification: 10/6/2022 to 11/5/2022.     Outpatient Physical Therapy 2 times weekly for 4 weeks to include the following interventions: Neuromuscular Re-ed, Patient Education, Therapeutic Exercise, and modalities as needed.  .  Brisa Richardson, PTA  10/13/2022

## 2022-10-14 ENCOUNTER — NUTRITION (OUTPATIENT)
Dept: FAMILY MEDICINE | Facility: CLINIC | Age: 57
End: 2022-10-14
Payer: COMMERCIAL

## 2022-10-14 ENCOUNTER — IMMUNIZATION (OUTPATIENT)
Dept: FAMILY MEDICINE | Facility: CLINIC | Age: 57
End: 2022-10-14
Payer: COMMERCIAL

## 2022-10-14 DIAGNOSIS — Z23 NEED FOR VACCINATION: Primary | ICD-10-CM

## 2022-10-14 DIAGNOSIS — E66.9 OBESITY, UNSPECIFIED CLASSIFICATION, UNSPECIFIED OBESITY TYPE, UNSPECIFIED WHETHER SERIOUS COMORBIDITY PRESENT: Primary | ICD-10-CM

## 2022-10-14 DIAGNOSIS — R73.01 IMPAIRED FASTING GLUCOSE: ICD-10-CM

## 2022-10-14 PROCEDURE — 91313 COVID-19, MRNA, LNP-S, BIVALENT BOOSTER, PF, 50 MCG/0.5 ML: CPT | Mod: ,,, | Performed by: NURSE PRACTITIONER

## 2022-10-14 PROCEDURE — 91313 COVID-19, MRNA, LNP-S, BIVALENT BOOSTER, PF, 50 MCG/0.5 ML: ICD-10-PCS | Mod: ,,, | Performed by: NURSE PRACTITIONER

## 2022-10-14 PROCEDURE — 0134A COVID-19, MRNA, LNP-S, BIVALENT BOOSTER, PF, 50 MCG/0.5 ML: CPT | Mod: ,,, | Performed by: NURSE PRACTITIONER

## 2022-10-14 PROCEDURE — 0134A COVID-19, MRNA, LNP-S, BIVALENT BOOSTER, PF, 50 MCG/0.5 ML: ICD-10-PCS | Mod: ,,, | Performed by: NURSE PRACTITIONER

## 2022-10-14 NOTE — PROGRESS NOTES
"  Pt comes to talk about PreDM labs and goals to prevent the oncoming Dx of DM. Weight management tips with emphasis on daily moving as able. Pt has PT coming to home and has home exercises to practice. Importance of moving to use Kcals.for wt control and Heart Healthy eating tips with low-fat meal planning discussed.   I explained labs, Hgba1c 6.1%, elevated TG and how the body uses food for fuel. Use of reading food labels for Total Fat, goal <5g/serving and Total CHO 15g=1CARB choice. We read food labels and planned sample meals, use of High Fiber Tortillas, very low carb instead of bread for sandwiches/buns, use of lots of the non-starchy veggies for satiety, lean meats. Demonstrated portion sizes with 1/2 cup, cupped hand for fruit, use of mug for cereal as eaten at b'fast.   Much encouragement for losing weight with healthy method, Pt states starting Rybelsus 3mg samples and has noticed satiety and less hunger, gave praise for coming in today to start goals to strive for prevention of DM and healthy weight, presently 198lbs 5'1".  Planned meals with use of 2-4CARB choices, lots of non-starchy veggies, lean meat, demonstrated what a plate divided looks like when portioning meal. Urged to avoid fruit juices and sugar containing beverages due to rapid rise of glucose and empty Kcals.  "

## 2022-10-20 ENCOUNTER — CLINICAL SUPPORT (OUTPATIENT)
Dept: REHABILITATION | Facility: HOSPITAL | Age: 57
End: 2022-10-20
Payer: COMMERCIAL

## 2022-10-20 DIAGNOSIS — M25.611 DECREASED ROM OF RIGHT SHOULDER: ICD-10-CM

## 2022-10-20 DIAGNOSIS — M25.612 DECREASED ROM OF LEFT SHOULDER: ICD-10-CM

## 2022-10-20 DIAGNOSIS — M75.101 TEAR OF RIGHT SUPRASPINATUS TENDON: ICD-10-CM

## 2022-10-20 DIAGNOSIS — M75.102 TEAR OF LEFT SUPRASPINATUS TENDON: Primary | ICD-10-CM

## 2022-10-20 PROCEDURE — 97140 MANUAL THERAPY 1/> REGIONS: CPT | Mod: PN

## 2022-10-20 PROCEDURE — 97110 THERAPEUTIC EXERCISES: CPT | Mod: PN

## 2022-10-20 NOTE — PROGRESS NOTES
Physical Therapy Treatment Note     Name: Sophia Kc  Clinic Number: [id number]    Therapy Diagnosis:   Encounter Diagnoses   Name Primary?    Tear of left supraspinatus tendon Yes    Tear of right supraspinatus tendon     Decreased ROM of right shoulder     Decreased ROM of left shoulder      Physician: Alejandra Gan MD    Visit Date: 10/20/2022  Physician Orders: PT Eval and Treat    Medical Diagnosis from Referral: bilateral shoulder rotator cuff tear , right worse than left   Evaluation Date: 10/6/2022  Updated Plan of Care Due : 11/5/2022  Authorization Period Expiration: [employer] 20 visits   Plan of Care Expiration: end of year   Visit # / Visits authorized: 3/ 20  PTA Visit #:     Time In: 1048  Time Out: 1128  Total Billable Time: 40 minutes    Precautions: Standard  Plan of care reviewed with Elver Alexander PT.      Subjective     Pt reports: improving   She was compliant with home exercise program.  Response to previous treatment: sore    Pain: 4/10  Location: bilateral shoulders    Objective     Sophia received therapeutic exercises to develop strength, endurance, ROM, and flexibility for 30 minutes including:    Upper body ergometer x 5'  pulleys x 4'  Scapular retraction blue x 10  Bilateral red theraband external rotation, internal rotation, flexion and extension x 10  Supine flexion with dowel x 10  Bilateral prone pulls, extension and horizontal abduction x 10  Biateral forearm wall slides  Bilateral hand behind bottom with scapular retraction x 10 reps      Range of motion   Shoulder flexion     right   164 /175        left   170  / 170  Abduction              right   170  /175        left   175  /180  External rotation     right   85/90       left 80/90    Sophia received the following manual therapy techniques: passive range of motion were applied to the: bilateral shoulders for 8 minutes.      Home Exercises Provided and Patient Education Provided     Education provided: home exercise  program     Written Home Exercises Provided: Patient instructed to cont prior HEP.  Exercises were reviewed and Sophia was able to demonstrate them prior to the end of the session.  Sophia demonstrated good  understanding of the education provided.     See EMR under Patient Instructions for exercises provided prior visit.    Assessment     Patient had improved range of motion in all planes this treatment , instructed to cont pain free  Sophia Is progressing well towards her goals.   Pt prognosis is Fair.     Pt will continue to benefit from skilled outpatient physical therapy to address the deficits listed in the problem list box on initial evaluation, provide pt/family education and to maximize pt's level of independence in the home and community environment.     Pt's spiritual, cultural and educational needs considered and pt agreeable to plan of care and goals.     Anticipated barriers to physical therapy: compliance with home exercise program     Goals:    Short Term Goals: 4 weeks   Pt will increase arom to 170 degrees flexion bilateral   Pt will increase strength to 3+/5 bilateral   Be able to internally rotate to t10 (improve less pain with dressing)  Not wake up at night with shoulder pain     Long Term Goals: 6 weeks   Pt will be able to work pain free  Dress pain free   Plan     Plan of care Certification: 10/6/2022 to 11/5/2022.     Outpatient Physical Therapy 2 times weekly for 4 weeks to include the following interventions: Neuromuscular Re-ed, Patient Education, Therapeutic Exercise, and modalities as needed.  .  Elver Alexander, PT  10/20/2022

## 2022-10-27 ENCOUNTER — CLINICAL SUPPORT (OUTPATIENT)
Dept: REHABILITATION | Facility: HOSPITAL | Age: 57
End: 2022-10-27
Payer: COMMERCIAL

## 2022-10-27 DIAGNOSIS — M75.101 TEAR OF RIGHT SUPRASPINATUS TENDON: ICD-10-CM

## 2022-10-27 DIAGNOSIS — M25.612 DECREASED ROM OF LEFT SHOULDER: ICD-10-CM

## 2022-10-27 DIAGNOSIS — M75.102 TEAR OF LEFT SUPRASPINATUS TENDON: Primary | ICD-10-CM

## 2022-10-27 DIAGNOSIS — M25.611 DECREASED ROM OF RIGHT SHOULDER: ICD-10-CM

## 2022-10-27 PROCEDURE — 97110 THERAPEUTIC EXERCISES: CPT | Mod: PN

## 2022-10-27 NOTE — PROGRESS NOTES
Physical Therapy Treatment Note     Name: Sophia Kc  Clinic Number: [id number]    Therapy Diagnosis:   No diagnosis found.    Physician: Alejandra Gan MD    Visit Date: 10/27/2022  Physician Orders: PT Eval and Treat    Medical Diagnosis from Referral: bilateral shoulder rotator cuff tear , right worse than left   Evaluation Date: 10/6/2022  Updated Plan of Care Due : 11/5/2022  Authorization Period Expiration: [employer] 20 visits   Plan of Care Expiration: end of year   Visit # / Visits authorized: 4/ 20  PTA Visit #:     Time In: 1048  Time Out: 1120  Total Billable Time: 32 minutes    Precautions: Standard  Plan of care reviewed with Elver Alexander PT.      Subjective     Pt reports: improving no pain today   She was compliant with home exercise program.  Response to previous treatment: sore    Pain: 0/10  Location: bilateral shoulders    Objective     Sophia received therapeutic exercises to develop strength, endurance, ROM, and flexibility for 30 minutes including:    Upper body ergometer x 5'  pulleys x 4'  Scapular retraction blue x 10  Bilateral red theraband external rotation, internal rotation, flexion and extension x 10  Supine flexion with dowel x 10  Bilateral prone pulls, extension and horizontal abduction x 10  Biateral forearm wall slides  Bilateral hand behind bottom with scapular retraction x 10 reps      Range of motion   Shoulder flexion     right   175 /180      left   170  / 170  Abduction              right   175 /180       left   175  /180  External rotation     right   90/90       left 90/90    Sophia received the following manual therapy techniques: passive range of motion were applied to the: bilateral shoulders for 8 minutes.      Home Exercises Provided and Patient Education Provided     Education provided: home exercise program     Written Home Exercises Provided: Patient instructed to cont prior HEP.  Exercises were reviewed and Sophia was able to demonstrate them prior to the  end of the session.  Sophia demonstrated good  understanding of the education provided.     See EMR under Patient Instructions for exercises provided prior visit.    Assessment     Patient had improved range of motion in all planes this treatment , instructed to cont pain free.  Will cont with strengthening   Sophia Is progressing well towards her goals.   Pt prognosis is Fair.     Pt will continue to benefit from skilled outpatient physical therapy to address the deficits listed in the problem list box on initial evaluation, provide pt/family education and to maximize pt's level of independence in the home and community environment.     Pt's spiritual, cultural and educational needs considered and pt agreeable to plan of care and goals.     Anticipated barriers to physical therapy: compliance with home exercise program     Goals:    Short Term Goals: 4 weeks   Pt will increase arom to 170 degrees flexion bilateral   Pt will increase strength to 3+/5 bilateral   Be able to internally rotate to t10 (improve less pain with dressing)  Not wake up at night with shoulder pain     Long Term Goals: 6 weeks   Pt will be able to work pain free  Dress pain free   Plan     Plan of care Certification: 10/6/2022 to 11/5/2022.     Outpatient Physical Therapy 2 times weekly for 4 weeks to include the following interventions: Neuromuscular Re-ed, Patient Education, Therapeutic Exercise, and modalities as needed.  .  Elver Alexander, PT  10/27/2022

## 2022-11-10 ENCOUNTER — CLINICAL SUPPORT (OUTPATIENT)
Dept: REHABILITATION | Facility: HOSPITAL | Age: 57
End: 2022-11-10
Payer: COMMERCIAL

## 2022-11-10 DIAGNOSIS — M25.611 DECREASED ROM OF RIGHT SHOULDER: ICD-10-CM

## 2022-11-10 DIAGNOSIS — M75.102 TEAR OF LEFT SUPRASPINATUS TENDON: Primary | ICD-10-CM

## 2022-11-10 DIAGNOSIS — M75.101 TEAR OF RIGHT SUPRASPINATUS TENDON: ICD-10-CM

## 2022-11-10 DIAGNOSIS — M25.612 DECREASED ROM OF LEFT SHOULDER: ICD-10-CM

## 2022-11-10 PROCEDURE — 97110 THERAPEUTIC EXERCISES: CPT | Mod: PN,CQ

## 2022-11-10 NOTE — PROGRESS NOTES
Physical Therapy Treatment Note     Name: Sophia Kc  Clinic Number: [id number]    Therapy Diagnosis:   Encounter Diagnoses   Name Primary?    Tear of left supraspinatus tendon Yes    Tear of right supraspinatus tendon     Decreased ROM of right shoulder     Decreased ROM of left shoulder        Physician: Alejandra Gan MD    Visit Date: 11/10/2022  Physician Orders: PT Eval and Treat    Medical Diagnosis from Referral: bilateral shoulder rotator cuff tear , right worse than left   Evaluation Date: 10/6/2022  Updated Plan of Care Due : 11/5/2022  Authorization Period Expiration: [employer] 20 visits   Plan of Care Expiration: end of year   Visit # / Visits authorized: 5/ 20  PTA Visit #: 1    Time In: 800  Time Out: 838  Total Billable Time: 38 minutes    Precautions: Standard       Subjective     Pt reports: improving no pain today, I'm going to see how I feel after next visit and may let that be my last.  She was compliant with home exercise program.  Response to previous treatment: sore    Pain: 0/10  Location: bilateral shoulders    Objective     Sophia received therapeutic exercises to develop strength, endurance, ROM, and flexibility for 38 minutes including:    Upper body ergometer x 5'  pulleys x 4'  Scapular retraction blue x 10  Scapular retraction thumbs out blue x 10  Bilateral red theraband external rotation, internal rotation, flexion and extension x 10  Sitting kathols red x 10  Bilateral supine external rotation/ internal rotation 15 x 2#  Supine flexion with dowel x 10  Bilateral hand behind bottom with scapular retraction x 10 reps   Doorway pectoralis stretch x 8      Range of motion   Shoulder flexion     right   175 /180       left   175 / 180  Abduction               right   175 /180       left   175  /180  External rotation     right   90/90            left   90/90    Sophia received the following manual therapy techniques: passive range of motion were applied to the: bilateral  shoulders for  minutes.      Home Exercises Provided and Patient Education Provided     Education provided: home exercise program     Written Home Exercises Provided: Patient instructed to cont prior HEP.  Exercises were reviewed and Sophia was able to demonstrate them prior to the end of the session.  Sophia demonstrated good  understanding of the education provided.     See EMR under Patient Instructions for exercises provided prior visit.    Assessment     Patient has excellent bilateral range of motion and progressing with posterior cuff strengthening   Sophia Is progressing well towards her goals.   Pt prognosis is Fair.     Pt will continue to benefit from skilled outpatient physical therapy to address the deficits listed in the problem list box on initial evaluation, provide pt/family education and to maximize pt's level of independence in the home and community environment.     Pt's spiritual, cultural and educational needs considered and pt agreeable to plan of care and goals.     Anticipated barriers to physical therapy: compliance with home exercise program     Goals:    Short Term Goals: 4 weeks   Pt will increase arom to 170 degrees flexion bilateral -met  Pt will increase strength to 3+/5 bilateral   Be able to internally rotate to t10 (improve less pain with dressing)-met  Not wake up at night with shoulder pain-met     Long Term Goals: 6 weeks   Pt will be able to work pain free-met  Dress pain free -met  Plan     Plan of care Certification: 10/6/2022 to 11/5/2022.     Outpatient Physical Therapy 2 times weekly for 4 weeks to include the following interventions: Neuromuscular Re-ed, Patient Education, Therapeutic Exercise, and modalities as needed.  .  Brisa Richardson, PTA  11/10/2022

## 2022-11-10 NOTE — PLAN OF CARE
Physical Therapy Treatment Note      Name: Sophia Kc  Clinic Number: [id number]     Therapy Diagnosis:        Encounter Diagnoses   Name Primary?    Tear of left supraspinatus tendon Yes    Tear of right supraspinatus tendon      Decreased ROM of right shoulder      Decreased ROM of left shoulder           Physician: Alejandra Gan MD     Visit Date: 11/10/2022  Physician Orders: PT Eval and Treat    Medical Diagnosis from Referral: bilateral shoulder rotator cuff tear , right worse than left   Evaluation Date: 10/6/2022  Updated Plan of Care Due : 11/5/2022  Authorization Period Expiration: [employer] 20 visits   Plan of Care Expiration: end of year   Visit # / Visits authorized: 5/ 20  PTA Visit #: 1     Time In: 800  Time Out: 838  Total Billable Time: 38 minutes     Precautions: Standard        Subjective      Pt reports: improving no pain today, I'm going to see how I feel after next visit and may let that be my last.  She was compliant with home exercise program.  Response to previous treatment: sore     Pain: 0/10  Location: bilateral shoulders     Objective      Sophia received therapeutic exercises to develop strength, endurance, ROM, and flexibility for 38 minutes including:     Upper body ergometer x 5'  pulleys x 4'  Scapular retraction blue x 10  Scapular retraction thumbs out blue x 10  Bilateral red theraband external rotation, internal rotation, flexion and extension x 10  Sitting kathols red x 10  Bilateral supine external rotation/ internal rotation 15 x 2#  Supine flexion with dowel x 10  Bilateral hand behind bottom with scapular retraction x 10 reps   Doorway pectoralis stretch x 8       Range of motion   Shoulder flexion     right   175 /180       left   175 / 180  Abduction               right   175 /180       left   175  /180  External rotation     right   90/90            left   90/90     Sophia received the following manual therapy techniques: passive range of motion were applied to  the: bilateral shoulders for  minutes.        Home Exercises Provided and Patient Education Provided      Education provided: home exercise program      Written Home Exercises Provided: Patient instructed to cont prior HEP.  Exercises were reviewed and Sophia was able to demonstrate them prior to the end of the session.  Sophia demonstrated good  understanding of the education provided.      See EMR under Patient Instructions for exercises provided prior visit.     Assessment      Patient has excellent bilateral range of motion and progressing with posterior cuff strengthening   Sophia Is progressing well towards her goals.   Pt prognosis is Fair.      Pt will continue to benefit from skilled outpatient physical therapy to address the deficits listed in the problem list box on initial evaluation, provide pt/family education and to maximize pt's level of independence in the home and community environment.      Pt's spiritual, cultural and educational needs considered and pt agreeable to plan of care and goals.     Anticipated barriers to physical therapy: compliance with home exercise program      Goals:     Short Term Goals: 4 weeks   Pt will increase arom to 170 degrees flexion bilateral -met  Pt will increase strength to 3+/5 bilateral   Be able to internally rotate to t10 (improve less pain with dressing)-met  Not wake up at night with shoulder pain-met     Long Term Goals: 6 weeks   Pt will be able to work pain free-met  Dress pain free -met    Reasons for Recertification of Therapy: cont PT    Plan     Updated Certification Period: 11/10/2022 to 12/9/2022  Recommended Treatment Plan: 2 times per week for 4 weeks: Patient Education   Other Recommendations: cont PT    Elver Alexander, PT  11/10/2022      I CERTIFY THE NEED FOR THESE SERVICES FURNISHED UNDER THIS PLAN OF TREATMENT AND WHILE UNDER MY CARE.    Physician's comments:      Physician's Signature: ___________________________________________________

## 2022-11-17 ENCOUNTER — CLINICAL SUPPORT (OUTPATIENT)
Dept: REHABILITATION | Facility: HOSPITAL | Age: 57
End: 2022-11-17
Payer: COMMERCIAL

## 2022-11-17 DIAGNOSIS — M75.101 TEAR OF RIGHT SUPRASPINATUS TENDON: ICD-10-CM

## 2022-11-17 DIAGNOSIS — M25.612 DECREASED ROM OF LEFT SHOULDER: ICD-10-CM

## 2022-11-17 DIAGNOSIS — M25.611 DECREASED ROM OF RIGHT SHOULDER: ICD-10-CM

## 2022-11-17 DIAGNOSIS — M75.102 TEAR OF LEFT SUPRASPINATUS TENDON: Primary | ICD-10-CM

## 2022-11-17 PROCEDURE — 97110 THERAPEUTIC EXERCISES: CPT | Mod: PN,CQ

## 2022-11-17 NOTE — PROGRESS NOTES
Physical Therapy Treatment Note     Name: Sophia Kc  Clinic Number: [id number]    Therapy Diagnosis:   Encounter Diagnoses   Name Primary?    Tear of left supraspinatus tendon Yes    Tear of right supraspinatus tendon     Decreased ROM of right shoulder     Decreased ROM of left shoulder        Physician: Alejandra Gan MD    Visit Date: 11/17/2022  Physician Orders: PT Eval and Treat    Medical Diagnosis from Referral: bilateral shoulder rotator cuff tear , right worse than left   Evaluation Date: 10/6/2022  Updated Plan of Care Due : 12/10/22  Authorization Period Expiration: [employer] 20 visits   Plan of Care Expiration: end of year   Visit # / Visits authorized: 6/ 20  PTA Visit #: 2    Time In: 930  Time Out:1008  Total Billable Time: 38 minutes    Precautions: Standard     Subjective     Pt reports: I slept on right side too long and it's aching  She was compliant with home exercise program.  Response to previous treatment: sore    Pain: 0/10 left, 2/10 right   Location: bilateral shoulders    Objective     Sophia received therapeutic exercises to develop strength, endurance, ROM, and flexibility for 38 minutes including:    Upper body ergometer x 5'  pulleys x 4'  Wall forearm slides with towel x 10  Wall angels x 10  Scapular retraction blue x 10  Scapular retraction thumbs out blue x 10  Bilateral red theraband external rotation, internal rotation, flexion and extension x 10  Sitting kathols red x 10  Bilateral supine external rotation/ internal rotation 15 x 2#  Supine flexion with dowel x 10  Bilateral hand behind bottom with scapular retraction x 10 reps   Doorway pectoralis stretch x 8      Range of motion   Shoulder flexion     right   175 /180       left   175 / 180  Abduction               right   175 /180       left   175  /180  External rotation     right   90/90            left   90/90    Sophia received the following manual therapy techniques: passive range of motion were applied to the:  bilateral shoulders for  minutes.      Home Exercises Provided and Patient Education Provided     Education provided: home exercise program     Written Home Exercises Provided: Patient instructed to cont prior HEP.  Exercises were reviewed and Sophia was able to demonstrate them prior to the end of the session.  Sophia demonstrated good  understanding of the education provided.     See EMR under Patient Instructions for exercises provided prior visit.    Assessment     Patient had tightness with end range of motion with forearm flexion and wall angels.  Sophia Is progressing well towards her goals.   Pt prognosis is Fair.     Pt will continue to benefit from skilled outpatient physical therapy to address the deficits listed in the problem list box on initial evaluation, provide pt/family education and to maximize pt's level of independence in the home and community environment.     Pt's spiritual, cultural and educational needs considered and pt agreeable to plan of care and goals.     Anticipated barriers to physical therapy: compliance with home exercise program     Goals:    Short Term Goals: 4 weeks   Pt will increase arom to 170 degrees flexion bilateral -met  Pt will increase strength to 3+/5 bilateral   Be able to internally rotate to t10 (improve less pain with dressing)-met  Not wake up at night with shoulder pain-met     Long Term Goals: 6 weeks   Pt will be able to work pain free-met  Dress pain free -met  Plan     Plan of care Certification: 11/10/22-12/10/22     Outpatient Physical Therapy 2 times weekly for 4 weeks to include the following interventions: Neuromuscular Re-ed, Patient Education, Therapeutic Exercise, and modalities as needed.  .  Brisa Richardson, PTA  11/17/2022

## 2022-12-12 ENCOUNTER — CLINICAL SUPPORT (OUTPATIENT)
Dept: REHABILITATION | Facility: HOSPITAL | Age: 57
End: 2022-12-12
Payer: COMMERCIAL

## 2022-12-12 DIAGNOSIS — M75.101 TEAR OF RIGHT SUPRASPINATUS TENDON: ICD-10-CM

## 2022-12-12 DIAGNOSIS — M25.611 DECREASED ROM OF RIGHT SHOULDER: ICD-10-CM

## 2022-12-12 DIAGNOSIS — M75.102 TEAR OF LEFT SUPRASPINATUS TENDON: Primary | ICD-10-CM

## 2022-12-12 DIAGNOSIS — M25.612 DECREASED ROM OF LEFT SHOULDER: ICD-10-CM

## 2022-12-12 PROCEDURE — 97035 APP MDLTY 1+ULTRASOUND EA 15: CPT | Mod: PN,CQ

## 2022-12-12 PROCEDURE — 97110 THERAPEUTIC EXERCISES: CPT | Mod: PN,CQ

## 2022-12-12 NOTE — PROGRESS NOTES
Physical Therapy Treatment Note     Name: Sophia Kc  Clinic Number: [id number]    Therapy Diagnosis:   Encounter Diagnoses   Name Primary?    Tear of left supraspinatus tendon Yes    Tear of right supraspinatus tendon     Decreased ROM of right shoulder     Decreased ROM of left shoulder        Physician: Alejandra Gan MD    Visit Date: 12/12/2022  Physician Orders: PT Eval and Treat    Medical Diagnosis from Referral: bilateral shoulder rotator cuff tear , right worse than left   Evaluation Date: 10/6/2022  Updated Plan of Care Due : 12/10/22  Authorization Period Expiration: [employer] 20 visits   Plan of Care Expiration: end of year   Visit # / Visits authorized: 7/ 20  PTA Visit #: 3    Time In: 1010  Time Out:1048  Total Billable Time: 38 minutes    Precautions: Standard     Subjective     Pt reports: I hurt all over today I think its the weather, neck is popping, and both shoulders hurt. It may be arthritis.  She was compliant with home exercise program.  Response to previous treatment: sore    Pain: 6/10 left, 5/10 right   Location: bilateral shoulders    Objective     Sophia received therapeutic exercises to develop strength, endurance, ROM, and flexibility for 38 minutes including:    Upper body ergometer x 5'  pulleys x 4'  Supine dowel flexion x 10  Scapular retraction blue x 10  Scapular retraction thumbs out blue x 10  Bilateral red theraband external rotation, internal rotation, flexion and extension x 10  Bilateral supine external rotation/ internal rotation x 15       Range of motion   Shoulder flexion     right   160 /170     left   170 / 175  Abduction               right   160/170       left   170  /175  External rotation     right   80/84           left  82/85    Sophia received the following manual therapy techniques: passive range of motion were applied to the: bilateral shoulders for  minutes.    Patient received continue ultrasound to bilateral shoulders 1.5 w/cm2/ 1 mhz x 8' without  adverse affects.  Home Exercises Provided and Patient Education Provided     Education provided: home exercise program     Written Home Exercises Provided: Patient instructed to cont prior HEP.  Exercises were reviewed and Sophia was able to demonstrate them prior to the end of the session.  Sophia demonstrated good  understanding of the education provided.     See EMR under Patient Instructions for exercises provided prior visit.    Assessment     Patient voicing decreased pain after ultrasound treatment, patient had  range of motion this treatment   Sophia Is progressing well towards her goals.   Pt prognosis is Fair.     Pt will continue to benefit from skilled outpatient physical therapy to address the deficits listed in the problem list box on initial evaluation, provide pt/family education and to maximize pt's level of independence in the home and community environment.     Pt's spiritual, cultural and educational needs considered and pt agreeable to plan of care and goals.     Anticipated barriers to physical therapy: compliance with home exercise program     Goals:    Short Term Goals: 4 weeks   Pt will increase arom to 170 degrees flexion bilateral -met  Pt will increase strength to 3+/5 bilateral   Be able to internally rotate to t10 (improve less pain with dressing)-met  Not wake up at night with shoulder pain-met     Long Term Goals: 6 weeks   Pt will be able to work pain free-met  Dress pain free -met  Plan     Plan of care Certification: 11/10/22-12/10/22     Outpatient Physical Therapy 2 times weekly for 4 weeks to include the following interventions: Neuromuscular Re-ed, Patient Education, Therapeutic Exercise, and modalities as needed.  .  Brisa Richardson, PTA  2022

## 2022-12-21 NOTE — PLAN OF CARE
Physical Therapy Treatment Note      Name: Sophia Kc  Clinic Number: [id number]     Therapy Diagnosis:        Encounter Diagnoses   Name Primary?    Tear of left supraspinatus tendon Yes    Tear of right supraspinatus tendon      Decreased ROM of right shoulder      Decreased ROM of left shoulder           Physician: Alejandra Gan MD     Visit Date: 12/12/2022  Physician Orders: PT Eval and Treat    Medical Diagnosis from Referral: bilateral shoulder rotator cuff tear , right worse than left   Evaluation Date: 10/6/2022  Updated Plan of Care Due : 12/10/22  Authorization Period Expiration: [employer] 20 visits   Plan of Care Expiration: end of year   Visit # / Visits authorized: 7/ 20  PTA Visit #: 3     Time In: 1010  Time Out:1048  Total Billable Time: 38 minutes     Precautions: Standard     Subjective      Pt reports: I hurt all over today I think its the weather, neck is popping, and both shoulders hurt. It may be arthritis.  She was compliant with home exercise program.  Response to previous treatment: sore     Pain: 6/10 left, 5/10 right   Location: bilateral shoulders     Objective      Sophia received therapeutic exercises to develop strength, endurance, ROM, and flexibility for 38 minutes including:     Upper body ergometer x 5'  pulleys x 4'  Supine dowel flexion x 10  Scapular retraction blue x 10  Scapular retraction thumbs out blue x 10  Bilateral red theraband external rotation, internal rotation, flexion and extension x 10  Bilateral supine external rotation/ internal rotation x 15         Range of motion   Shoulder flexion     right   160 /170     left   170 / 175  Abduction               right   160/170       left   170  /175  External rotation     right   80/84           left  82/85     Sophia received the following manual therapy techniques: passive range of motion were applied to the: bilateral shoulders for  minutes.     Patient received continue ultrasound to bilateral shoulders 1.5  w/cm2/ 1 mhz x 8' without adverse affects.  Home Exercises Provided and Patient Education Provided      Education provided: home exercise program      Written Home Exercises Provided: Patient instructed to cont prior HEP.  Exercises were reviewed and Sophia was able to demonstrate them prior to the end of the session.  Sophia demonstrated good  understanding of the education provided.      See EMR under Patient Instructions for exercises provided prior visit.     Assessment      Patient voicing decreased pain after ultrasound treatment, patient had  range of motion this treatment   Sophia Is progressing well towards her goals.   Pt prognosis is Fair.      Pt will continue to benefit from skilled outpatient physical therapy to address the deficits listed in the problem list box on initial evaluation, provide pt/family education and to maximize pt's level of independence in the home and community environment.      Pt's spiritual, cultural and educational needs considered and pt agreeable to plan of care and goals.     Anticipated barriers to physical therapy: compliance with home exercise program      Goals:     Short Term Goals: 4 weeks   Pt will increase arom to 170 degrees flexion bilateral -met  Pt will increase strength to 3+/5 bilateral   Be able to internally rotate to t10 (improve less pain with dressing)-met  Not wake up at night with shoulder pain-met     Long Term Goals: 6 weeks   Pt will be able to work pain free-met  Dress pain free -met  Plan   Discharge reason: Patient has completed the physician's prescription and Patient has reached the maximum rehab potential for the present time    Plan   This patient is discharged from Physical Therapy.      Elver Alexander, PT

## 2023-01-05 ENCOUNTER — OFFICE VISIT (OUTPATIENT)
Dept: FAMILY MEDICINE | Facility: CLINIC | Age: 58
End: 2023-01-05
Payer: COMMERCIAL

## 2023-01-05 VITALS
SYSTOLIC BLOOD PRESSURE: 124 MMHG | WEIGHT: 189 LBS | BODY MASS INDEX: 35.68 KG/M2 | TEMPERATURE: 98 F | HEIGHT: 61 IN | DIASTOLIC BLOOD PRESSURE: 88 MMHG | RESPIRATION RATE: 18 BRPM | HEART RATE: 70 BPM | OXYGEN SATURATION: 96 %

## 2023-01-05 DIAGNOSIS — G89.29 CHRONIC RIGHT SHOULDER PAIN: Primary | ICD-10-CM

## 2023-01-05 DIAGNOSIS — M25.511 CHRONIC RIGHT SHOULDER PAIN: Primary | ICD-10-CM

## 2023-01-05 PROCEDURE — 1159F MED LIST DOCD IN RCRD: CPT | Mod: ,,, | Performed by: FAMILY MEDICINE

## 2023-01-05 PROCEDURE — 3008F PR BODY MASS INDEX (BMI) DOCUMENTED: ICD-10-PCS | Mod: ,,, | Performed by: FAMILY MEDICINE

## 2023-01-05 PROCEDURE — 1159F PR MEDICATION LIST DOCUMENTED IN MEDICAL RECORD: ICD-10-PCS | Mod: ,,, | Performed by: FAMILY MEDICINE

## 2023-01-05 PROCEDURE — 20610 PR DRAIN/INJECT LARGE JOINT/BURSA: ICD-10-PCS | Mod: ,,, | Performed by: FAMILY MEDICINE

## 2023-01-05 PROCEDURE — 1160F PR REVIEW ALL MEDS BY PRESCRIBER/CLIN PHARMACIST DOCUMENTED: ICD-10-PCS | Mod: ,,, | Performed by: FAMILY MEDICINE

## 2023-01-05 PROCEDURE — 20610 DRAIN/INJ JOINT/BURSA W/O US: CPT | Mod: ,,, | Performed by: FAMILY MEDICINE

## 2023-01-05 PROCEDURE — 1160F RVW MEDS BY RX/DR IN RCRD: CPT | Mod: ,,, | Performed by: FAMILY MEDICINE

## 2023-01-05 PROCEDURE — 3074F SYST BP LT 130 MM HG: CPT | Mod: ,,, | Performed by: FAMILY MEDICINE

## 2023-01-05 PROCEDURE — 3079F DIAST BP 80-89 MM HG: CPT | Mod: ,,, | Performed by: FAMILY MEDICINE

## 2023-01-05 PROCEDURE — 3079F PR MOST RECENT DIASTOLIC BLOOD PRESSURE 80-89 MM HG: ICD-10-PCS | Mod: ,,, | Performed by: FAMILY MEDICINE

## 2023-01-05 PROCEDURE — 3008F BODY MASS INDEX DOCD: CPT | Mod: ,,, | Performed by: FAMILY MEDICINE

## 2023-01-05 PROCEDURE — 99213 PR OFFICE/OUTPT VISIT, EST, LEVL III, 20-29 MIN: ICD-10-PCS | Mod: 25,,, | Performed by: FAMILY MEDICINE

## 2023-01-05 PROCEDURE — 3074F PR MOST RECENT SYSTOLIC BLOOD PRESSURE < 130 MM HG: ICD-10-PCS | Mod: ,,, | Performed by: FAMILY MEDICINE

## 2023-01-05 PROCEDURE — 99213 OFFICE O/P EST LOW 20 MIN: CPT | Mod: 25,,, | Performed by: FAMILY MEDICINE

## 2023-01-05 RX ORDER — TRIAMCINOLONE ACETONIDE 40 MG/ML
40 INJECTION, SUSPENSION INTRA-ARTICULAR; INTRAMUSCULAR ONCE
Status: COMPLETED | OUTPATIENT
Start: 2023-01-05 | End: 2023-01-05

## 2023-01-05 RX ADMIN — TRIAMCINOLONE ACETONIDE 40 MG: 40 INJECTION, SUSPENSION INTRA-ARTICULAR; INTRAMUSCULAR at 08:01

## 2023-01-05 NOTE — PROGRESS NOTES
Alejandra Gan MD        PATIENT NAME: Sophia Kc  : 1965  DATE: 23  MRN: [id number]      Billing Provider: Alejandra Gan MD  Level of Service:   Patient PCP Information       Provider PCP Type    Alejandra Gan MD General            Reason for Visit / Chief Complaint: Shoulder Pain (Ongoing right shoulder pain, has been to Physical Therapy, 2 weeks after her last session with them it really started bothering her, decreased range of motion, does have numbness and tingling at time in her fingers) and Arm Pain       History of Present Illness      Sophia Kc presents to the clinic with Shoulder Pain (Ongoing right shoulder pain, has been to Physical Therapy, 2 weeks after her last session with them it really started bothering her, decreased range of motion, does have numbness and tingling at time in her fingers) and Arm Pain     Shoulder Pain   The pain is present in the right shoulder. This is a recurrent problem. The current episode started in the past 7 days. The pain is at a severity of 8/10. Associated symptoms include an inability to bear weight and a limited range of motion. Pertinent negatives include no fever, headaches, joint swelling or tingling.   Arm Pain   Pertinent negatives include no tingling.     Review of Systems     Review of Systems   Constitutional:  Negative for activity change, appetite change, fatigue and fever.   Respiratory:  Negative for shortness of breath.    Allergic/Immunologic: Positive for environmental allergies.   Neurological:  Negative for tingling and headaches.   Psychiatric/Behavioral:  Negative for agitation, behavioral problems and suicidal ideas.      Medical / Social / Family History     Past Medical History:   Diagnosis Date    Hx of colonoscopy 10/05/2021    negative    Hypertension        Past Surgical History:   Procedure Laterality Date    HYSTERECTOMY         Social History  Ms.  reports that she has never smoked. She has never used smokeless  "tobacco. She reports current alcohol use of about 4.0 standard drinks per week. She reports that she does not use drugs.    Family History  MsMaddy's family history includes Arthritis in her mother; Breast cancer in her maternal aunt.    Medications and Allergies     Medications  Outpatient Medications Marked as Taking for the 1/5/23 encounter (Office Visit) with Alejandra Gan MD   Medication Sig Dispense Refill    atenoloL-chlorthalidone (TENORETIC) 50-25 mg Tab Take 1 tablet by mouth once daily. 30 tablet 11     Current Facility-Administered Medications for the 1/5/23 encounter (Office Visit) with Alejandra Gan MD   Medication Dose Route Frequency Provider Last Rate Last Admin    triamcinolone acetonide injection 40 mg  40 mg Intramuscular Once Alejandra Gan MD           Allergies  Review of patient's allergies indicates:   Allergen Reactions    Lisinopril        Physical Examination   /88 (BP Location: Left arm, Patient Position: Sitting)   Pulse 70   Temp 98.1 °F (36.7 °C) (Oral)   Resp 18   Ht 5' 1" (1.549 m)   Wt 85.7 kg (189 lb)   SpO2 96%   BMI 35.71 kg/m²     Physical Exam  Constitutional:       Appearance: Normal appearance. She is normal weight.   Cardiovascular:      Rate and Rhythm: Normal rate and regular rhythm.      Pulses: Normal pulses.      Heart sounds: Normal heart sounds.   Pulmonary:      Effort: Pulmonary effort is normal.   Musculoskeletal:      Right shoulder: Tenderness and bony tenderness present. Decreased range of motion.   Skin:     General: Skin is warm.   Neurological:      General: No focal deficit present.      Mental Status: She is alert.   Psychiatric:         Mood and Affect: Mood normal.         Behavior: Behavior normal.         Judgment: Judgment normal.       Procedure: Shoulder injection  Performed by Dr. Gan  Reason for procedure:  After informed consent was obtained, A Time out was performed with patient.  Area was cleansed with rubbing alcohol in sterile " fashion. Topical anesthesia was then achieved using Gebaur's Ethyl Chloride. The right shoulder joint was then injected using a posterior approach. After initially entering joint, plunger was withdrawn to prevent entry into blood vessel. After appropriate anatomical location was confirmed, 3mL of 1%Lidocaine w/o Epi and 1mL of Kenalog (40mg) with 3 mL of 0.25% marcaine was injected into the joint. The needle was withdrawn and direct pressure was applied over the site. The joint was then mobilized into an abbreviated arc of motion and pt was advised to stay seated to prevent any falls after injection. Pt tolerated procedure well and bandage was placed over injection site.    After care instruction given      Assessment and Plan (including Health Maintenance)     Plan:         Problem List Items Addressed This Visit    None  Visit Diagnoses       Chronic right shoulder pain    -  Primary    Relevant Medications    triamcinolone acetonide injection 40 mg (Start on 1/5/2023  9:00 AM)              Follow up in about 3 months (around 4/5/2023).        Signature:  Alejandra Gan MD      Date of encounter: 1/5/23

## 2023-01-05 NOTE — LETTER
January 5, 2023      [facility] [address] [place] [address]  Phone: [phone]  Fax: [phone]       Patient: Sophia Kc   YOB: 1965  Date of Visit: 01/05/2023    To Whom It May Concern:    Maribeth Kc  was at  on 01/05/2023. The patient may return to work/school on 1/9/2023 with no restrictions. If you have any questions or concerns, or if I can be of further assistance, please do not hesitate to contact me.    Sincerely,  Dr Alejandra Rosario RN

## 2023-01-18 ENCOUNTER — HOSPITAL ENCOUNTER (OUTPATIENT)
Dept: RADIOLOGY | Facility: HOSPITAL | Age: 58
Discharge: HOME | End: 2023-01-18
Attending: FAMILY MEDICINE
Payer: COMMERCIAL

## 2023-01-18 DIAGNOSIS — Z12.39 ENCOUNTER FOR SCREENING FOR MALIGNANT NEOPLASM OF BREAST, UNSPECIFIED SCREENING MODALITY: ICD-10-CM

## 2023-01-18 PROCEDURE — 77067 SCR MAMMO BI INCL CAD: CPT | Mod: TC

## 2023-03-22 ENCOUNTER — OFFICE VISIT (OUTPATIENT)
Dept: FAMILY MEDICINE | Facility: CLINIC | Age: 58
End: 2023-03-22
Payer: COMMERCIAL

## 2023-03-22 VITALS
HEART RATE: 98 BPM | TEMPERATURE: 97 F | OXYGEN SATURATION: 98 % | BODY MASS INDEX: 35.3 KG/M2 | RESPIRATION RATE: 18 BRPM | DIASTOLIC BLOOD PRESSURE: 86 MMHG | SYSTOLIC BLOOD PRESSURE: 120 MMHG | HEIGHT: 61 IN | WEIGHT: 187 LBS

## 2023-03-22 DIAGNOSIS — J32.4 PANSINUSITIS, UNSPECIFIED CHRONICITY: Primary | ICD-10-CM

## 2023-03-22 DIAGNOSIS — Z86.39 HISTORY OF DIABETES MELLITUS, TYPE II: ICD-10-CM

## 2023-03-22 DIAGNOSIS — I10 ESSENTIAL HYPERTENSION: ICD-10-CM

## 2023-03-22 DIAGNOSIS — J02.9 SORE THROAT: ICD-10-CM

## 2023-03-22 DIAGNOSIS — R73.01 IMPAIRED FASTING GLUCOSE: ICD-10-CM

## 2023-03-22 DIAGNOSIS — R05.1 ACUTE COUGH: ICD-10-CM

## 2023-03-22 LAB
CTP QC/QA: YES
CTP QC/QA: YES
FLUAV AG NPH QL: NEGATIVE
FLUBV AG NPH QL: NEGATIVE
S PYO RRNA THROAT QL PROBE: NEGATIVE
SARS-COV-2 AG RESP QL IA.RAPID: NEGATIVE

## 2023-03-22 PROCEDURE — 1160F PR REVIEW ALL MEDS BY PRESCRIBER/CLIN PHARMACIST DOCUMENTED: ICD-10-PCS | Mod: ,,, | Performed by: NURSE PRACTITIONER

## 2023-03-22 PROCEDURE — 87880 POCT RAPID STREP A: ICD-10-PCS | Mod: QW,,, | Performed by: NURSE PRACTITIONER

## 2023-03-22 PROCEDURE — 96372 THER/PROPH/DIAG INJ SC/IM: CPT | Mod: ,,, | Performed by: NURSE PRACTITIONER

## 2023-03-22 PROCEDURE — 3008F BODY MASS INDEX DOCD: CPT | Mod: ,,, | Performed by: NURSE PRACTITIONER

## 2023-03-22 PROCEDURE — 1159F PR MEDICATION LIST DOCUMENTED IN MEDICAL RECORD: ICD-10-PCS | Mod: ,,, | Performed by: NURSE PRACTITIONER

## 2023-03-22 PROCEDURE — 87428 SARSCOV & INF VIR A&B AG IA: CPT | Mod: QW,,, | Performed by: NURSE PRACTITIONER

## 2023-03-22 PROCEDURE — 87880 STREP A ASSAY W/OPTIC: CPT | Mod: QW,,, | Performed by: NURSE PRACTITIONER

## 2023-03-22 PROCEDURE — 87428 POCT SARS-COV2 (COVID) WITH FLU ANTIGEN: ICD-10-PCS | Mod: QW,,, | Performed by: NURSE PRACTITIONER

## 2023-03-22 PROCEDURE — 1159F MED LIST DOCD IN RCRD: CPT | Mod: ,,, | Performed by: NURSE PRACTITIONER

## 2023-03-22 PROCEDURE — 3044F HG A1C LEVEL LT 7.0%: CPT | Mod: ,,, | Performed by: NURSE PRACTITIONER

## 2023-03-22 PROCEDURE — 99214 PR OFFICE/OUTPT VISIT, EST, LEVL IV, 30-39 MIN: ICD-10-PCS | Mod: 25,,, | Performed by: NURSE PRACTITIONER

## 2023-03-22 PROCEDURE — 3044F PR MOST RECENT HEMOGLOBIN A1C LEVEL <7.0%: ICD-10-PCS | Mod: ,,, | Performed by: NURSE PRACTITIONER

## 2023-03-22 PROCEDURE — 99214 OFFICE O/P EST MOD 30 MIN: CPT | Mod: 25,,, | Performed by: NURSE PRACTITIONER

## 2023-03-22 PROCEDURE — 3074F PR MOST RECENT SYSTOLIC BLOOD PRESSURE < 130 MM HG: ICD-10-PCS | Mod: ,,, | Performed by: NURSE PRACTITIONER

## 2023-03-22 PROCEDURE — 3079F DIAST BP 80-89 MM HG: CPT | Mod: ,,, | Performed by: NURSE PRACTITIONER

## 2023-03-22 PROCEDURE — 3079F PR MOST RECENT DIASTOLIC BLOOD PRESSURE 80-89 MM HG: ICD-10-PCS | Mod: ,,, | Performed by: NURSE PRACTITIONER

## 2023-03-22 PROCEDURE — 96372 PR INJECTION,THERAP/PROPH/DIAG2ST, IM OR SUBCUT: ICD-10-PCS | Mod: ,,, | Performed by: NURSE PRACTITIONER

## 2023-03-22 PROCEDURE — 3074F SYST BP LT 130 MM HG: CPT | Mod: ,,, | Performed by: NURSE PRACTITIONER

## 2023-03-22 PROCEDURE — 1160F RVW MEDS BY RX/DR IN RCRD: CPT | Mod: ,,, | Performed by: NURSE PRACTITIONER

## 2023-03-22 PROCEDURE — 3008F PR BODY MASS INDEX (BMI) DOCUMENTED: ICD-10-PCS | Mod: ,,, | Performed by: NURSE PRACTITIONER

## 2023-03-22 RX ORDER — ATENOLOL AND CHLORTHALIDONE TABLET 50; 25 MG/1; MG/1
1 TABLET ORAL DAILY
Qty: 90 TABLET | Refills: 1 | Status: SHIPPED | OUTPATIENT
Start: 2023-03-22 | End: 2023-07-31 | Stop reason: SDUPTHER

## 2023-03-22 RX ORDER — CEFTRIAXONE 1 G/1
1 INJECTION, POWDER, FOR SOLUTION INTRAMUSCULAR; INTRAVENOUS
Status: COMPLETED | OUTPATIENT
Start: 2023-03-22 | End: 2023-03-22

## 2023-03-22 RX ORDER — AZITHROMYCIN 250 MG/1
TABLET, FILM COATED ORAL
Qty: 6 TABLET | Refills: 0 | Status: SHIPPED | OUTPATIENT
Start: 2023-03-22 | End: 2023-03-27

## 2023-03-22 RX ORDER — DEXAMETHASONE SODIUM PHOSPHATE 4 MG/ML
4 INJECTION, SOLUTION INTRA-ARTICULAR; INTRALESIONAL; INTRAMUSCULAR; INTRAVENOUS; SOFT TISSUE
Status: COMPLETED | OUTPATIENT
Start: 2023-03-22 | End: 2023-03-22

## 2023-03-22 RX ORDER — PROMETHAZINE HYDROCHLORIDE AND DEXTROMETHORPHAN HYDROBROMIDE 6.25; 15 MG/5ML; MG/5ML
5 SYRUP ORAL NIGHTLY PRN
Qty: 118 ML | Refills: 0 | Status: SHIPPED | OUTPATIENT
Start: 2023-03-22 | End: 2023-04-01

## 2023-03-22 RX ADMIN — CEFTRIAXONE 1 G: 1 INJECTION, POWDER, FOR SOLUTION INTRAMUSCULAR; INTRAVENOUS at 04:03

## 2023-03-22 RX ADMIN — DEXAMETHASONE SODIUM PHOSPHATE 4 MG: 4 INJECTION, SOLUTION INTRA-ARTICULAR; INTRALESIONAL; INTRAMUSCULAR; INTRAVENOUS; SOFT TISSUE at 04:03

## 2023-03-22 NOTE — LETTER
March 22, 2023      [facility] [address] [place] [address]  Phone: [phone]  Fax: [phone]       Patient: Sophia Kc   YOB: 1965  Date of Visit: 03/22/2023    To Whom It May Concern:    Maribeth Kc  was at [facility] on 03/22/2023. The patient may return to work/school on 03/24/2023 with no restrictions. If you have any questions or concerns, or if I can be of further assistance, please do not hesitate to contact me.    Sincerely,    Tonya Rodriguez LPN

## 2023-03-23 PROBLEM — J02.9 SORE THROAT: Status: ACTIVE | Noted: 2023-03-23

## 2023-03-23 PROBLEM — R05.1 ACUTE COUGH: Status: ACTIVE | Noted: 2023-03-23

## 2023-03-23 PROBLEM — Z86.39 HISTORY OF DIABETES MELLITUS, TYPE II: Status: RESOLVED | Noted: 2023-03-23 | Resolved: 2023-03-23

## 2023-03-23 PROBLEM — J32.4 PANSINUSITIS: Status: ACTIVE | Noted: 2023-03-23

## 2023-03-23 PROBLEM — Z86.39 HISTORY OF DIABETES MELLITUS, TYPE II: Status: ACTIVE | Noted: 2023-03-23

## 2023-03-23 NOTE — PROGRESS NOTES
STEPHANIE [place]   [facility]  [id number] [place] [address]  [phone]      PATIENT NAME: Sophia Kc  : 1965  DATE: 3/22/23  MRN: [id number]      Billing Provider: STEPHANIE Jordan  Level of Service:   Patient PCP Information       Provider PCP Type    STEPHANIE Griffin General            Reason for Visit / Chief Complaint: Cough (Non productive), Sore Throat, Headache (States she has a nagging headache. /States her gums are sore and she feels pressure around her nasal cavity. ), Nasal Congestion (States she has been having clear nasal drainage/States her eyes are watering as well, but describes the drainage as thick but clear. /All symptoms X3 days. States symptoms continue to worsen. ), and Medication Refill       Update PCP  Update Chief Complaint         History of Present Illness / Problem Focused Workflow     57 year old female presents with complaints of cough, congestion, sore throat x 3 days  Denies any fever, n/v  Also complaints of needing medication refills    Hx of hypertension and elevated glucose      Review of Systems     Review of Systems   Constitutional:  Positive for fatigue. Negative for chills and fever.   HENT:  Positive for congestion. Negative for ear pain and sore throat.    Eyes:  Negative for discharge.   Respiratory:  Positive for cough. Negative for shortness of breath.    Cardiovascular:  Negative for chest pain.   Gastrointestinal:  Negative for abdominal pain, diarrhea and nausea.   Musculoskeletal:  Negative for gait problem.   Neurological:  Positive for headaches. Negative for dizziness and weakness.   Psychiatric/Behavioral:  Negative for dysphoric mood. The patient is not nervous/anxious.      Medical / Social / Family History     Past Medical History:   Diagnosis Date    Hx of colonoscopy 10/05/2021    negative    Hypertension        Past Surgical History:   Procedure Laterality Date    HYSTERECTOMY       OOPHORECTOMY         Social History  Ms.  reports that she has never smoked. She has never used smokeless tobacco. She reports current alcohol use of about 4.0 standard drinks per week. She reports that she does not use drugs.    Family History  Ms.'s family history includes Arthritis in her mother; Breast cancer in her maternal aunt.    Medications and Allergies     Medications  Outpatient Medications Marked as Taking for the 3/22/23 encounter (Office Visit) with STEPHANIE Jordan   Medication Sig Dispense Refill    potassium chloride (MICRO-K) 10 MEQ CpSR Take 1 capsule by mouth once daily 90 capsule 0    [DISCONTINUED] atenoloL-chlorthalidone (TENORETIC) 50-25 mg Tab Take 1 tablet by mouth once daily. 30 tablet 11     Current Facility-Administered Medications for the 3/22/23 encounter (Office Visit) with STEPHANIE Jordan   Medication Dose Route Frequency Provider Last Rate Last Admin    [COMPLETED] cefTRIAXone injection 1 g  1 g Intramuscular 1 time in Clinic/HOD DAVID JordanP   1 g at 03/22/23 1604    [COMPLETED] dexAMETHasone injection 4 mg  4 mg Intramuscular 1 time in Clinic/HOD STEPHANIE Jordan   4 mg at 03/22/23 1604       Allergies  Review of patient's allergies indicates:   Allergen Reactions    Lisinopril        Physical Examination     Vitals:    03/22/23 1509   BP: 120/86   Pulse: 98   Resp: 18   Temp: 97.3 °F (36.3 °C)     Physical Exam  Constitutional:       General: She is not in acute distress.  HENT:      Head: Normocephalic.      Nose: Congestion present.      Mouth/Throat:      Mouth: Mucous membranes are moist.   Eyes:      Extraocular Movements: Extraocular movements intact.   Cardiovascular:      Rate and Rhythm: Normal rate.      Heart sounds: Normal heart sounds.   Pulmonary:      Effort: Pulmonary effort is normal. No respiratory distress.      Breath sounds: No wheezing.   Abdominal:      General: Bowel sounds are normal.      Palpations: Abdomen is soft.   Musculoskeletal:          General: Normal range of motion.      Cervical back: Normal range of motion.   Skin:     General: Skin is warm.   Neurological:      Mental Status: She is alert and oriented to person, place, and time.   Psychiatric:         Mood and Affect: Mood normal.         Imaging / Labs       Office Visit on 03/22/2023   Component Date Value Ref Range Status    SARS Coronavirus 2 Antigen 03/22/2023 Negative  Negative Final    Rapid Influenza A Ag 03/22/2023 Negative  Negative Final    Rapid Influenza B Ag 03/22/2023 Negative  Negative Final     Acceptable 03/22/2023 Yes   Final    Rapid Strep A Screen 03/22/2023 Negative  Negative Final     Acceptable 03/22/2023 Yes   Final       Assessment and Plan (including Health Maintenance)      Problem List  Smart Sets  Document Outside HM   :    There are no preventive care reminders to display for this patient.      Problem List Items Addressed This Visit          ENT    Pansinusitis - Primary    Current Assessment & Plan     Rocephin and decadron IM  zithromax po  Rest. Increase fluids as tolerated            Relevant Medications    cefTRIAXone injection 1 g (Completed)    dexAMETHasone injection 4 mg (Completed)    azithromycin (Z-CAROLANN) 250 MG tablet    Sore throat    Current Assessment & Plan     Covid/flu, strep negative         Relevant Orders    POCT SARS-COV2 (COVID) with Flu Antigen (Completed)    POCT rapid strep A (Completed)       Pulmonary    Acute cough    Current Assessment & Plan     Phenergan DM @ HS prn         Relevant Medications    promethazine-dextromethorphan (PROMETHAZINE-DM) 6.25-15 mg/5 mL Syrp    Other Relevant Orders    POCT SARS-COV2 (COVID) with Flu Antigen (Completed)    POCT rapid strep A (Completed)       Cardiac/Vascular    Essential hypertension    Current Assessment & Plan     Refill meds today  Will return for labs when fasting    Follow up about 6 mo for repeat labs         Relevant Medications     atenoloL-chlorthalidone (TENORETIC) 50-25 mg Tab    Other Relevant Orders    Lipid Panel    CBC Auto Differential    Comprehensive Metabolic Panel       Endocrine    Impaired fasting glucose    Current Assessment & Plan     Return for A1C and lipid when fasting          RESOLVED: History of diabetes mellitus, type II    Relevant Orders    Lipid Panel    CBC Auto Differential    Comprehensive Metabolic Panel    Hemoglobin A1C       Health Maintenance Topics with due status: Not Due       Topic Last Completion Date    TETANUS VACCINE 04/21/2022    Hemoglobin A1c (Prediabetes) 09/26/2022    Lipid Panel 09/26/2022    Mammogram 01/18/2023       Future Appointments   Date Time Provider Department Center   1/23/2024  9:00 AM [facility] MAMMO1 [facility] [place] [facility] [place]          Signature:  STEPHANIE [place]  [facility]IC[facility]  [address] [place] [id number]  [phone]    Date of encounter: 3/22/23

## 2023-05-24 ENCOUNTER — OFFICE VISIT (OUTPATIENT)
Dept: FAMILY MEDICINE | Facility: CLINIC | Age: 58
End: 2023-05-24
Payer: COMMERCIAL

## 2023-05-24 VITALS
HEART RATE: 75 BPM | DIASTOLIC BLOOD PRESSURE: 82 MMHG | SYSTOLIC BLOOD PRESSURE: 120 MMHG | RESPIRATION RATE: 18 BRPM | HEIGHT: 61 IN | BODY MASS INDEX: 36.44 KG/M2 | WEIGHT: 193 LBS | TEMPERATURE: 98 F | OXYGEN SATURATION: 99 %

## 2023-05-24 DIAGNOSIS — M25.511 CHRONIC RIGHT SHOULDER PAIN: Primary | ICD-10-CM

## 2023-05-24 DIAGNOSIS — M54.50 BILATERAL LOW BACK PAIN WITHOUT SCIATICA, UNSPECIFIED CHRONICITY: ICD-10-CM

## 2023-05-24 DIAGNOSIS — G89.29 CHRONIC RIGHT SHOULDER PAIN: Primary | ICD-10-CM

## 2023-05-24 PROCEDURE — 1160F RVW MEDS BY RX/DR IN RCRD: CPT | Mod: ICN,,, | Performed by: NURSE PRACTITIONER

## 2023-05-24 PROCEDURE — 1159F PR MEDICATION LIST DOCUMENTED IN MEDICAL RECORD: ICD-10-PCS | Mod: ICN,,, | Performed by: NURSE PRACTITIONER

## 2023-05-24 PROCEDURE — 1160F PR REVIEW ALL MEDS BY PRESCRIBER/CLIN PHARMACIST DOCUMENTED: ICD-10-PCS | Mod: ICN,,, | Performed by: NURSE PRACTITIONER

## 2023-05-24 PROCEDURE — 3079F DIAST BP 80-89 MM HG: CPT | Mod: ICN,,, | Performed by: NURSE PRACTITIONER

## 2023-05-24 PROCEDURE — 3074F PR MOST RECENT SYSTOLIC BLOOD PRESSURE < 130 MM HG: ICD-10-PCS | Mod: ICN,,, | Performed by: NURSE PRACTITIONER

## 2023-05-24 PROCEDURE — 3008F BODY MASS INDEX DOCD: CPT | Mod: ICN,,, | Performed by: NURSE PRACTITIONER

## 2023-05-24 PROCEDURE — 3008F PR BODY MASS INDEX (BMI) DOCUMENTED: ICD-10-PCS | Mod: ICN,,, | Performed by: NURSE PRACTITIONER

## 2023-05-24 PROCEDURE — 3044F HG A1C LEVEL LT 7.0%: CPT | Mod: ICN,,, | Performed by: NURSE PRACTITIONER

## 2023-05-24 PROCEDURE — 96372 PR INJECTION,THERAP/PROPH/DIAG2ST, IM OR SUBCUT: ICD-10-PCS | Mod: ICN,,, | Performed by: NURSE PRACTITIONER

## 2023-05-24 PROCEDURE — 3044F PR MOST RECENT HEMOGLOBIN A1C LEVEL <7.0%: ICD-10-PCS | Mod: ICN,,, | Performed by: NURSE PRACTITIONER

## 2023-05-24 PROCEDURE — 99213 OFFICE O/P EST LOW 20 MIN: CPT | Mod: 25,ICN,, | Performed by: NURSE PRACTITIONER

## 2023-05-24 PROCEDURE — 99213 PR OFFICE/OUTPT VISIT, EST, LEVL III, 20-29 MIN: ICD-10-PCS | Mod: 25,ICN,, | Performed by: NURSE PRACTITIONER

## 2023-05-24 PROCEDURE — 96372 THER/PROPH/DIAG INJ SC/IM: CPT | Mod: ICN,,, | Performed by: NURSE PRACTITIONER

## 2023-05-24 PROCEDURE — 1159F MED LIST DOCD IN RCRD: CPT | Mod: ICN,,, | Performed by: NURSE PRACTITIONER

## 2023-05-24 PROCEDURE — 3079F PR MOST RECENT DIASTOLIC BLOOD PRESSURE 80-89 MM HG: ICD-10-PCS | Mod: ICN,,, | Performed by: NURSE PRACTITIONER

## 2023-05-24 PROCEDURE — 3074F SYST BP LT 130 MM HG: CPT | Mod: ICN,,, | Performed by: NURSE PRACTITIONER

## 2023-05-24 RX ORDER — METHYLPREDNISOLONE ACETATE 40 MG/ML
40 INJECTION, SUSPENSION INTRA-ARTICULAR; INTRALESIONAL; INTRAMUSCULAR; SOFT TISSUE
Status: COMPLETED | OUTPATIENT
Start: 2023-05-24 | End: 2023-05-24

## 2023-05-24 RX ORDER — DEXAMETHASONE SODIUM PHOSPHATE 4 MG/ML
4 INJECTION, SOLUTION INTRA-ARTICULAR; INTRALESIONAL; INTRAMUSCULAR; INTRAVENOUS; SOFT TISSUE
Status: COMPLETED | OUTPATIENT
Start: 2023-05-24 | End: 2023-05-24

## 2023-05-24 RX ORDER — KETOROLAC TROMETHAMINE 10 MG/1
10 TABLET, FILM COATED ORAL EVERY 6 HOURS
Qty: 20 TABLET | Refills: 0 | Status: SHIPPED | OUTPATIENT
Start: 2023-05-24 | End: 2023-05-29

## 2023-05-24 RX ADMIN — METHYLPREDNISOLONE ACETATE 40 MG: 40 INJECTION, SUSPENSION INTRA-ARTICULAR; INTRALESIONAL; INTRAMUSCULAR; SOFT TISSUE at 11:05

## 2023-05-24 RX ADMIN — DEXAMETHASONE SODIUM PHOSPHATE 4 MG: 4 INJECTION, SOLUTION INTRA-ARTICULAR; INTRALESIONAL; INTRAMUSCULAR; INTRAVENOUS; SOFT TISSUE at 11:05

## 2023-05-24 NOTE — LETTER
May 24, 2023      [facility] [address] [place] [address]  Phone: [phone]  Fax: [phone]       Patient: Sophia Kc   YOB: 1965  Date of Visit: 05/24/2023    To Whom It May Concern:    Maribeth Kc  was at [facility] on 05/24/2023. The patient may return to work/school on 05/25/2023 with no restrictions. If you have any questions or concerns, or if I can be of further assistance, please do not hesitate to contact me.    Sincerely,    Lubna Oconnor RN

## 2023-05-25 NOTE — ASSESSMENT & PLAN NOTE
Reports this it chronic problem  Depomedrol, decadron IM  toradol po prn  Rest. Heating pad on low to air    Follow up if symptoms fail to improve

## 2023-05-25 NOTE — PROGRESS NOTES
STEPHANIE [place]   [facility]  [address] [address] [place] [address]  [phone]      PATIENT NAME: Sophia Kc  : 1965  DATE: 23  MRN: [id number]      Billing Provider: STEPHANIE Jordan  Level of Service:   Patient PCP Information       Provider PCP Type    STEPHANIE Griffin General            Reason for Visit / Chief Complaint: Low-back Pain (Chronic./X1 wk.) and Shoulder Pain (Chronic R shoulder pain./X2 wks.)       Update PCP  Update Chief Complaint         History of Present Illness / Problem Focused Workflow     57 year old female presents with complaints of low back and right shoulder pain   States pain has worsened over the past 2 weeks  Reports she is aware of needing shoulder repair but does not have time to take off of work right now  She is trying to wait as long as she can to have surgery  States this has been ongoing for months  Denies any new injury      Hx of hypertension and elevated glucose      Review of Systems     Review of Systems   Constitutional:  Positive for fatigue. Negative for chills and fever.   HENT:  Positive for congestion. Negative for ear pain and sore throat.    Eyes:  Negative for discharge.   Respiratory:  Positive for cough. Negative for shortness of breath.    Cardiovascular:  Negative for chest pain.   Gastrointestinal:  Negative for abdominal pain, diarrhea and nausea.   Musculoskeletal:  Negative for gait problem.   Neurological:  Positive for headaches. Negative for dizziness and weakness.   Psychiatric/Behavioral:  Negative for dysphoric mood. The patient is not nervous/anxious.      Medical / Social / Family History     Past Medical History:   Diagnosis Date    Chronic lumbar pain     Chronic pain in right shoulder     Hx of colonoscopy 10/05/2021    negative    Hypertension        Past Surgical History:   Procedure Laterality Date    HYSTERECTOMY      OOPHORECTOMY         Social History  MsMaddy  reports  that she has never smoked. She has never used smokeless tobacco. She reports current alcohol use of about 4.0 standard drinks per week. She reports that she does not use drugs.    Family History  Ms.'s family history includes Arthritis in her mother; Breast cancer in her maternal aunt.    Medications and Allergies     Medications  Outpatient Medications Marked as Taking for the 5/24/23 encounter (Office Visit) with STEPHANIE Jordan   Medication Sig Dispense Refill    atenoloL-chlorthalidone (TENORETIC) 50-25 mg Tab Take 1 tablet by mouth once daily. 90 tablet 1       Allergies  Review of patient's allergies indicates:   Allergen Reactions    Lisinopril        Physical Examination     Vitals:    05/24/23 1052   BP: 120/82   Pulse: 75   Resp: 18   Temp: 97.7 °F (36.5 °C)     Physical Exam  Constitutional:       General: She is not in acute distress.  HENT:      Head: Normocephalic.      Nose: Congestion present.      Mouth/Throat:      Mouth: Mucous membranes are moist.   Eyes:      Extraocular Movements: Extraocular movements intact.   Cardiovascular:      Rate and Rhythm: Normal rate.      Heart sounds: Normal heart sounds.   Pulmonary:      Effort: Pulmonary effort is normal. No respiratory distress.      Breath sounds: No wheezing.   Abdominal:      General: Bowel sounds are normal.      Palpations: Abdomen is soft.   Musculoskeletal:         General: Normal range of motion.      Cervical back: Normal range of motion.   Skin:     General: Skin is warm.   Neurological:      Mental Status: She is alert and oriented to person, place, and time.   Psychiatric:         Mood and Affect: Mood normal.         Imaging / Labs       No visits with results within 1 Day(s) from this visit.   Latest known visit with results is:   Lab Visit on 03/24/2023   Component Date Value Ref Range Status    Triglycerides 03/24/2023 143  35 - 150 mg/dL Final    Cholesterol 03/24/2023 152  0 - 200 mg/dL Final    HDL Cholesterol 03/24/2023  52  40 - 60 mg/dL Final    Cholesterol/HDL Ratio (Risk Factor) 03/24/2023 2.9   Final    Non-HDL 03/24/2023 100  mg/dL Final    LDL Calculated 03/24/2023 71  mg/dL Final    LDL/HDL 03/24/2023 1.4   Final    VLDL 03/24/2023 29  mg/dL Final    Sodium 03/24/2023 140  136 - 145 mmol/L Final    Potassium 03/24/2023 3.5  3.5 - 5.1 mmol/L Final    Chloride 03/24/2023 106  98 - 107 mmol/L Final    CO2 03/24/2023 26  21 - 32 mmol/L Final    Anion Gap 03/24/2023 12  7 - 16 mmol/L Final    Glucose 03/24/2023 129 (H)  74 - 106 mg/dL Final    BUN 03/24/2023 18  7 - 18 mg/dL Final    Creatinine 03/24/2023 0.98  0.55 - 1.02 mg/dL Final    BUN/Creatinine Ratio 03/24/2023 18  6 - 20 Final    Calcium 03/24/2023 9.6  8.5 - 10.1 mg/dL Final    Total Protein 03/24/2023 7.8  6.4 - 8.2 g/dL Final    Albumin 03/24/2023 3.6  3.5 - 5.0 g/dL Final    Globulin 03/24/2023 4.2 (H)  2.0 - 4.0 g/dL Final    A/G Ratio 03/24/2023 0.9   Final    Bilirubin, Total 03/24/2023 0.4  >0.0 - 1.2 mg/dL Final    Alk Phos 03/24/2023 83  46 - 118 U/L Final    ALT 03/24/2023 29  13 - 56 U/L Final    AST 03/24/2023 16  15 - 37 U/L Final    eGFR 03/24/2023 68  >=60 mL/min/1.73m² Final    Hemoglobin A1C 03/24/2023 5.8  4.5 - 6.6 % Final    Estimated Average Glucose 03/24/2023 107  mg/dL Final    WBC 03/24/2023 11.87 (H)  4.50 - 11.00 K/uL Final    RBC 03/24/2023 5.59 (H)  4.20 - 5.40 M/uL Final    Hemoglobin 03/24/2023 16.2 (H)  12.0 - 16.0 g/dL Final    Hematocrit 03/24/2023 50.3 (H)  38.0 - 47.0 % Final    MCV 03/24/2023 90.0  80.0 - 96.0 fL Final    MCH 03/24/2023 29.0  27.0 - 31.0 pg Final    MCHC 03/24/2023 32.2  32.0 - 36.0 g/dL Final    RDW 03/24/2023 13.6  11.5 - 14.5 % Final    Platelet Count 03/24/2023 223  150 - 400 K/uL Final    MPV 03/24/2023 10.4  9.4 - 12.4 fL Final    Neutrophils % 03/24/2023 74.3 (H)  53.0 - 65.0 % Final    Lymphocytes % 03/24/2023 15.9 (L)  27.0 - 41.0 % Final    Monocytes % 03/24/2023 7.2 (H)  2.0 - 6.0 % Final    Eosinophils %  03/24/2023 1.6  1.0 - 4.0 % Final    Basophils % 03/24/2023 0.7  0.0 - 1.0 % Final    Immature Granulocytes % 03/24/2023 0.3  0.0 - 0.4 % Final    nRBC, Auto 03/24/2023 0.0  <=0.0 % Final    Neutrophils, Abs 03/24/2023 8.82 (H)  1.80 - 7.70 K/uL Final    Lymphocytes, Absolute 03/24/2023 1.89  1.00 - 4.80 K/uL Final    Monocytes, Absolute 03/24/2023 0.85 (H)  0.00 - 0.80 K/uL Final    Eosinophils, Absolute 03/24/2023 0.19  0.00 - 0.50 K/uL Final    Basophils, Absolute 03/24/2023 0.08  0.00 - 0.20 K/uL Final    Immature Granulocytes, Absolute 03/24/2023 0.04  0.00 - 0.04 K/uL Final    nRBC, Absolute 03/24/2023 0.00  <=0.00 x10e3/uL Final    Diff Type 03/24/2023 Auto   Final       Assessment and Plan (including Health Maintenance)      Problem List  Smart Sets  Document Outside HM   :    Health Maintenance Due   Topic Date Due    Shingles Vaccine (1 of 2) Never done         Problem List Items Addressed This Visit          Orthopedic    Low back pain    Chronic right shoulder pain - Primary    Current Assessment & Plan     Reports this it chronic problem  Depomedrol, decadron IM  toradol po prn  Rest. Heating pad on low to air    Follow up if symptoms fail to improve              Health Maintenance Topics with due status: Not Due       Topic Last Completion Date    TETANUS VACCINE 04/21/2022    Mammogram 01/18/2023    Hemoglobin A1c (Prediabetes) 03/24/2023    Lipid Panel 03/24/2023    Influenza Vaccine Not Due       Future Appointments   Date Time Provider Department Center   1/23/2024  9:00 AM [facility] [place] [facility] [place]          Signature:  Mirian Marquez LADONNA  [address] [address]  [phone]    Date of encounter: 5/24/23

## 2023-07-31 DIAGNOSIS — I10 ESSENTIAL HYPERTENSION: ICD-10-CM

## 2023-08-01 RX ORDER — ATENOLOL AND CHLORTHALIDONE TABLET 50; 25 MG/1; MG/1
1 TABLET ORAL DAILY
Qty: 30 TABLET | Refills: 0 | Status: SHIPPED | OUTPATIENT
Start: 2023-08-01 | End: 2024-07-31

## 2024-02-07 DIAGNOSIS — M72.2 PLANTAR FASCIAL FIBROMATOSIS: Primary | ICD-10-CM

## 2024-02-13 ENCOUNTER — CLINICAL SUPPORT (OUTPATIENT)
Dept: REHABILITATION | Facility: HOSPITAL | Age: 59
End: 2024-02-13
Payer: COMMERCIAL

## 2024-02-13 DIAGNOSIS — M72.2 PLANTAR FASCIAL FIBROMATOSIS: Primary | ICD-10-CM

## 2024-02-13 DIAGNOSIS — M72.2 PLANTAR FASCIAL FIBROMATOSIS OF RIGHT FOOT: ICD-10-CM

## 2024-02-13 DIAGNOSIS — M72.2 PLANTAR FASCIAL FIBROMATOSIS OF LEFT FOOT: ICD-10-CM

## 2024-02-13 PROCEDURE — 97161 PT EVAL LOW COMPLEX 20 MIN: CPT | Mod: PN

## 2024-02-13 PROCEDURE — 97110 THERAPEUTIC EXERCISES: CPT | Mod: PN

## 2024-02-13 NOTE — PLAN OF CARE
[facility]   Physical Therapy Initial Evaluation    Date: 2/13/2024   Name: Sophia Kc  Clinic Number: [id number]    Therapy Diagnosis:   Encounter Diagnoses   Name Primary?    Plantar fascial fibromatosis Yes    Plantar fascial fibromatosis of left foot     Plantar fascial fibromatosis of right foot      Physician: Javier Dunham DPM    Physician Orders: PT Eval and Treat    Medical Diagnosis from Referral: bilateral plantar fibromatosis   Evaluation Date: 2/13/2024  Updated Plan of Care Due : 3/13/2024  Authorization Period Expiration: [employer]   Plan of Care Expiration: 3/13/2023  Visit # / Visits authorized: 1/ 20    Time In: 1445  Time Out: 1530  Total Appointment Time (timed & untimed codes): 45 minutes    Precautions: Standard    Subjective   Date of onset: pt states she has been having bad foot pain bilaterally with left worse than right side. Pt voices she has worse pain with getting up and putting her feet on the floor in the morning. Pt voices  she has pain in the morning. And pain during work while on her feet .  Pt voices she had injection 2 weeks ago and it helped a good bit .   History of current condition - Sophia reports: hx      Medical History:   Past Medical History:   Diagnosis Date    Chronic lumbar pain     Chronic pain in right shoulder     Hx of colonoscopy 10/05/2021    negative    Hypertension        Surgical History:   Sophia Kc  has a past surgical history that includes Hysterectomy and Oophorectomy.    Medications:   Sophia has a current medication list which includes the following prescription(s): atenolol-chlorthalidone and potassium chloride.    Allergies:   Review of patient's allergies indicates:   Allergen Reactions    Lisinopril         Imaging, bone scan films:      Prior Therapy: none   Social History:   lives with their family  Occupation: [employer]   Prior Level of Function: indpependent   Current Level of Function: chronic foot pain  plantar fasciitis     Pain:  Current 8/10, worst 10/10, best 3/10   Location: bilateral feet   plantar fascia    Description: Dull, Burning, Throbbing, Grabbing, Tight, Deep, and Sharp  Aggravating Factors: Standing and Walking  Easing Factors: nothing    Patients goals: be able to not have foot pain with activity     Objective         Observation :     Pronation/Supination : Right                                           Left     Incision :    n/a         Comments :         Range of Motion/Strength :                  Left Extremity                                                                        Right Extremity   AROM PROM Strength  Location  AROM    PROM   Strength   90  4   Hip      Flexion 85  4                      -38  4 Hamstring  -35  4                     125  4   Knee    Flexion 125  4   0                  Extension 0     5  4   Ankle   Dorsiflexion 8  4                     Plantar Flexion                        Inversion                        Eversion                     Functional Impairments :        Limitation/Restriction for FOTO ankle  Survey    Therapist reviewed FOTO scores for Sophia Kc on 2/13/2024.   FOTO documents entered into [employer] - see Media section.    Limitation Score: 43%         TREATMENT   Pt received plantar fascia massage and cont US to bilateral feet with home ex program     Sophia received the treatments listed below:  THERAPEUTIC EXERCISES to develop strength, endurance, ROM, flexibility, and posture for 20 minutes including home ex program         Home Exercises and Patient Education Provided    Education provided:   - Pt performed and received home ex program.     Written Home Exercises Provided: Patient instructed to cont prior HEP.  Exercises were reviewed and Sophia was able to demonstrate them prior to the end of the session.  Sophia demonstrated good  understanding of the education provided.     See EMR under Patient Instructions for exercises provided  2/13/2024.    Assessment   Sophia is a 58 y.o. female referred to outpatient Physical Therapy with a medical diagnosis of bilateral plantar fibromatosis . Patient presents with bilateral glute tightness, bilateral hamstring tightness, decreased bilateral dorsiflexion .      Patient prognosis is Excellent.   Patientt will benefit from skilled outpatient Physical Therapy to address the deficits stated above and in the chart below, provide patient /family education, and to maximize patientt's level of independence.     Plan of care discussed with patient: Yes  Patient's spiritual, cultural and educational needs considered and patient is agreeable to the plan of care and goals as stated below:     Anticipated Barriers for therapy: medical diagnosis of bilateral plantar fibromatosis . Patient presents with bilateral glute tightness, bilateral hamstring tightness, decreased bilateral dorsiflexion .    Goals:  Short Term Goals: 4 weeks   Pt will be independent with home ex program   Pt will increase bilateral hip flexion to 110 degrees   Pt will improve hamstring length from -38  to -20 degrees bilaterally   Improve dorsiflexion to 10 degrees bilaterally   Decrease pain to 5/10 when first awaking     Long Term Goals: 6 weeks   Pt will increase bilateral hip flexion to 120   Improve hamstring length to -10 bilaterally   Increase bilateral dorsiflexion to 15 degrees   Decrease paint to 2/10    Plan   Plan of care Certification: 2/13/2024 to 3/13/2024.    Outpatient Physical Therapy 2 times weekly for 4 weeks to include the following interventions: Manual Therapy, Neuromuscular Re-ed, Patient Education, Therapeutic Activities, Therapeutic Exercise, Ultrasound, and modalities as needed.     Plan of care has been reestablished with Naye Richardson LPTA, Emily Rosario LPTA, Tran Costello LPTA  and Palua Baen LPTA.   .     Elver Alexander, PT         I CERTIFY THE NEED FOR THESE SERVICES FURNISHED UNDER THIS PLAN OF TREATMENT AND WHILE  UNDER  CARE.    Physician's comments:      Physician's Signature: ___________________________________________________

## 2024-02-20 ENCOUNTER — CLINICAL SUPPORT (OUTPATIENT)
Dept: REHABILITATION | Facility: HOSPITAL | Age: 59
End: 2024-02-20
Payer: COMMERCIAL

## 2024-02-20 DIAGNOSIS — Z74.09 DECREASED FUNCTIONAL MOBILITY AND ENDURANCE: ICD-10-CM

## 2024-02-20 DIAGNOSIS — M72.2 PLANTAR FASCIAL FIBROMATOSIS: Primary | ICD-10-CM

## 2024-02-20 PROCEDURE — 97035 APP MDLTY 1+ULTRASOUND EA 15: CPT | Mod: PN,CQ

## 2024-02-20 PROCEDURE — 97530 THERAPEUTIC ACTIVITIES: CPT | Mod: PN,CQ

## 2024-02-20 PROCEDURE — 97112 NEUROMUSCULAR REEDUCATION: CPT | Mod: PN,CQ

## 2024-02-20 NOTE — PROGRESS NOTES
Physical Therapy Treatment Note     Name: Sophia Kc  Clinic Number: [id number]    Therapy Diagnosis: No diagnosis found.  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 I keep changing shoes for work to see if that'll help.  She was compliant with home exercise program.  Response to previous treatment:less pain  Functional change: ongoing    Pain: 3/10  Location: bilateral feet      Objective     Sophia received therapeutic exercises to develop strength, endurance, and ROM for 10 minutes including:  Bilateral ankle alphabet x 1  Bilateral ankle foot circles cw./ccw x 15  Bilateral ankle dorsiflexion x 15    Range of motion   Dorsiflexion   right  9      left   6  Hip flexion     right 92     left   88   Hamstrings   right  -35   left   -35    Sophia received the following manual therapy techniques: Myofacial release were applied to the bilateral plantar fascia 5  minutes with deep tissue massage    Sophia participated in neuromuscular re-education activities to improve: Balance, Kinesthetic, and Proprioception for 8 minutes. The following activities   were included:  Slant board x 2' with glut set   Bilateral  hamstrings stretch on step x 5    Sophia participated in dynamic functional therapeutic activities to improve functional performance for 8  minutes, including:  Double support squats on total gym level 10 x 20 to promote squatting and bending  Double support calf raises total gym x 15 to be able to reach on tip toes    Sophia received the  following direct contact modalities after being cleared for contraindications: Ultrasound:  Sophia received ultrasound to manage pain and inflammation at 100 % duty cycle applied to the bilateral plantar fascia at an intensity 2.2 W/cm2/ 3 mhz  for a duration of 10 minutes. Patient tolerated treatment well without adverse effects. Therapist was in attendance throughout intervention.    Sophia received the following supervised modalities after being cleared for contradictions:       Home Exercises Provided and Patient Education Provided     Education provided: home exercise program     Written Home Exercises Provided: Patient instructed to cont prior HEP.  Exercises were reviewed and Sophia was able to demonstrate them prior to the end of the session.  Sophia demonstrated good  understanding of the education provided.     See EMR under Patient Instructions for exercises provided prior visit.    Assessment     Patient had more muscle rigidity in right plantar fascia compared to left. Patient had less pain with ambulation after treatment 1/10  Sophia Is progressing well towards her goals.   Pt prognosis is Good.     Pt will continue to benefit from skilled outpatient physical therapy to address the deficits listed in the problem list box on initial evaluation, provide pt/family education and to maximize pt's level of independence in the home and community environment.     Pt's spiritual, cultural and educational needs considered and pt agreeable to plan of care and goals.     Anticipated barriers to physical therapy: compliance with home exercise program     Goals:  Short Term Goals: 4 weeks   Pt will be independent with home ex program   Pt will increase bilateral hip flexion to 110 degrees   Pt will improve hamstring length from -38  to -20 degrees bilaterally   Improve dorsiflexion to 10 degrees bilaterally   Decrease pain to 5/10 when first awaking      Long Term Goals: 6 weeks   Pt will increase bilateral hip  flexion to 120   Improve hamstring length to -10 bilaterally   Increase bilateral dorsiflexion to 15 degrees   Decrease paint to 2/10    Plan     Plan of care Certification: 2/13/2024 to 3/13/2024.     Outpatient Physical Therapy 2 times weekly for 4 weeks to include the following interventions: Manual Therapy, Neuromuscular Re-ed, Patient Education, Therapeutic Activities, Therapeutic Exercise, Ultrasound, and modalities as needed. Plan of care reviewed with Elver Alexander PT.    Brisa Richardson, PTA  2/20/2024

## 2024-02-27 ENCOUNTER — CLINICAL SUPPORT (OUTPATIENT)
Dept: REHABILITATION | Facility: HOSPITAL | Age: 59
End: 2024-02-27
Payer: COMMERCIAL

## 2024-02-27 DIAGNOSIS — M72.2 PLANTAR FASCIAL FIBROMATOSIS: ICD-10-CM

## 2024-02-27 DIAGNOSIS — Z74.09 DECREASED FUNCTIONAL MOBILITY AND ENDURANCE: Primary | ICD-10-CM

## 2024-02-27 PROCEDURE — 97112 NEUROMUSCULAR REEDUCATION: CPT | Mod: PN,CQ

## 2024-02-27 PROCEDURE — 97035 APP MDLTY 1+ULTRASOUND EA 15: CPT | Mod: PN,CQ

## 2024-02-27 PROCEDURE — 97110 THERAPEUTIC EXERCISES: CPT | Mod: PN,CQ

## 2024-02-27 NOTE — PROGRESS NOTES
Physical Therapy Treatment Note     Name: Sophia Kc  Clinic Number: [id number]    Therapy Diagnosis:   Encounter Diagnoses   Name Primary?    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  She was compliant with home exercise program.  Response to previous treatment:less pain  Functional change: ongoing    Pain: 6/10  Location: bilateral feet      Objective     Sophia received therapeutic exercises to develop strength, endurance, and ROM for 10 minutes including:  Bilateral ankle alphabet x 1  Bilateral ankle foot circles cw./ccw x 15  Bilateral ankle dorsiflexion x 15  Bilateral dorsiflexion, eversion, internal rotation red theraband x 10    Range of motion   Dorsiflexion   right  9      left   9  Hip flexion     right 92     left   88   Hamstrings   right  -35   left   -35    Sophia received the following manual therapy techniques: Myofacial release were applied to the bilateral plantar fascia 9  minutes with deep tissue massage    Sophia participated in neuromuscular re-education activities to improve: Balance, Kinesthetic, and Proprioception for 8 minutes. The following activities   were included:  Slant board x 2' with glut set   Bilateral  hamstrings stretch on step x 5    Sophia participated in dynamic functional therapeutic activities to improve functional performance for 8  minutes, including:  Double support squats on  total gym level 10 x 20 to promote squatting and bending  Double support calf raises total gym x 15 to be able to reach on tip toes    Sophia received the following direct contact modalities after being cleared for contraindications: Ultrasound:  Sophia received ultrasound to manage pain and inflammation at 100 % duty cycle applied to the bilateral plantar fascia at an intensity 2.2 W/cm2/ 3 mhz  for a duration of 10 minutes. Patient tolerated treatment well without adverse effects. Therapist was in attendance throughout intervention.    Sophia received the following supervised modalities after being cleared for contradictions:       Home Exercises Provided and Patient Education Provided     Education provided: home exercise program     Written Home Exercises Provided: Patient instructed to cont prior HEP.  Exercises were reviewed and Sophia was able to demonstrate them prior to the end of the session.  Sophia demonstrated good  understanding of the education provided.     See EMR under Patient Instructions for exercises provided prior visit.    Assessment     Patient had more plantar ridigity this session, voicing decreased 4/10 after treatment  Sophia Is progressing well towards her goals.   Pt prognosis is Good.     Pt will continue to benefit from skilled outpatient physical therapy to address the deficits listed in the problem list box on initial evaluation, provide pt/family education and to maximize pt's level of independence in the home and community environment.     Pt's spiritual, cultural and educational needs considered and pt agreeable to plan of care and goals.     Anticipated barriers to physical therapy: compliance with home exercise program     Goals:  Short Term Goals: 4 weeks   Pt will be independent with home ex program   Pt will increase bilateral hip flexion to 110 degrees   Pt will improve hamstring length from -38  to -20 degrees bilaterally   Improve dorsiflexion to 10 degrees  bilaterally   Decrease pain to 5/10 when first awaking      Long Term Goals: 6 weeks   Pt will increase bilateral hip flexion to 120   Improve hamstring length to -10 bilaterally   Increase bilateral dorsiflexion to 15 degrees   Decrease paint to 2/10    Plan     Plan of care Certification: 2/13/2024 to 3/13/2024.     Outpatient Physical Therapy 2 times weekly for 4 weeks to include the following interventions: Manual Therapy, Neuromuscular Re-ed, Patient Education, Therapeutic Activities, Therapeutic Exercise, Ultrasound, and modalities as needed. Plan of care reviewed with Elver Alexander PT.    Brisa Richardson, PTA  2/27/2024

## 2024-03-14 ENCOUNTER — CLINICAL SUPPORT (OUTPATIENT)
Dept: REHABILITATION | Facility: HOSPITAL | Age: 59
End: 2024-03-14
Payer: COMMERCIAL

## 2024-03-14 DIAGNOSIS — M72.2 PLANTAR FASCIAL FIBROMATOSIS: Primary | ICD-10-CM

## 2024-03-14 DIAGNOSIS — Z74.09 DECREASED FUNCTIONAL MOBILITY AND ENDURANCE: ICD-10-CM

## 2024-03-14 PROCEDURE — 97530 THERAPEUTIC ACTIVITIES: CPT | Mod: PN,CQ

## 2024-03-14 PROCEDURE — 97035 APP MDLTY 1+ULTRASOUND EA 15: CPT | Mod: PN,CQ

## 2024-03-14 PROCEDURE — 97112 NEUROMUSCULAR REEDUCATION: CPT | Mod: PN,CQ

## 2024-03-14 NOTE — PROGRESS NOTES
Physical Therapy Treatment Note     Name: Sophia Kc  Clinic Number: [id number]    Therapy Diagnosis:   Encounter Diagnoses   Name Primary?    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  Response to previous treatment:less pain  Functional change: ongoing    Pain: 5/10  Location: bilateral feet      Objective     Sophia received therapeutic exercises to develop strength, endurance, and ROM for 10 minutes including:  Bilateral ankle alphabet x 1  Bilateral ankle foot circles cw./ccw x 15  Bilateral ankle dorsiflexion x 15  Bilateral dorsiflexion, eversion, internal rotation blue theraband x 10    Range of motion   Dorsiflexion   right  10      left  11  Hip flexion     right 92     left   88   Hamstrings   right  -35   left   -35    Sophia received the following manual therapy techniques: Myofacial release were applied to the bilateral plantar fascia 8  minutes with deep tissue massage    Sophia participated in neuromuscular re-education activities to improve: Balance, Kinesthetic, and Proprioception for 8 minutes. The following activities   were included:  Slant board x 2' with glut set   Bilateral  hamstrings stretch on step x 5    Sophia participated in dynamic functional therapeutic activities to improve functional performance for 12  minutes, including:  Biking x 5' to increase endurance with walking  Double support  squats on total gym level 10 x 20 to promote squatting and bending  Double support calf raises total gym x 15 to be able to reach on tip toes    Sophia received the following direct contact modalities after being cleared for contraindications: Ultrasound:  Sophia received ultrasound to manage pain and inflammation at 100 % duty cycle applied to the bilateral plantar fascia at an intensity 2.2 W/cm2/ 3 mhz  for a duration of 10 minutes. Patient tolerated treatment well without adverse effects. Therapist was in attendance throughout intervention.    Sophia received the following supervised modalities after being cleared for contradictions:       Home Exercises Provided and Patient Education Provided     Education provided: home exercise program     Written Home Exercises Provided: Patient instructed to cont prior HEP.  Exercises were reviewed and Sophia was able to demonstrate them prior to the end of the session.  Sophia demonstrated good  understanding of the education provided.     See EMR under Patient Instructions for exercises provided prior visit.    Assessment     Patient had decreased tenderness along right plantar fascia, still has tightness along lateral left plantar fascia.  Sophia Is progressing well towards her goals.    Pt prognosis is Good.     Pt will continue to benefit from skilled outpatient physical therapy to address the deficits listed in the problem list box on initial evaluation, provide pt/family education and to maximize pt's level of independence in the home and community environment.     Pt's spiritual, cultural and educational needs considered and pt agreeable to plan of care and goals.     Anticipated barriers to physical therapy: compliance with home exercise program     Goals:  Short Term Goals: 4 weeks   Pt will be independent with home ex program   Pt will increase bilateral hip flexion to 110 degrees   Pt will improve hamstring length from -38  to -20 degrees bilaterally    Improve dorsiflexion to 10 degrees bilaterally   Decrease pain to 5/10 when first awaking      Long Term Goals: 6 weeks   Pt will increase bilateral hip flexion to 120   Improve hamstring length to -10 bilaterally   Increase bilateral dorsiflexion to 15 degrees   Decrease paint to 2/10    Plan     Plan of care Certification: 2/13/2024 to 3/13/2024.     Outpatient Physical Therapy 2 times weekly for 4 weeks to include the following interventions: Manual Therapy, Neuromuscular Re-ed, Patient Education, Therapeutic Activities, Therapeutic Exercise, Ultrasound, and modalities as needed. Plan of care reviewed with Elver Alexander, PT.    Brisa Richardson, PTA  3/14/2024

## 2024-03-22 ENCOUNTER — CLINICAL SUPPORT (OUTPATIENT)
Dept: REHABILITATION | Facility: HOSPITAL | Age: 59
End: 2024-03-22
Payer: COMMERCIAL

## 2024-03-22 DIAGNOSIS — Z74.09 DECREASED FUNCTIONAL MOBILITY AND ENDURANCE: ICD-10-CM

## 2024-03-22 DIAGNOSIS — M72.2 PLANTAR FASCIAL FIBROMATOSIS: Primary | ICD-10-CM

## 2024-03-22 PROCEDURE — 97112 NEUROMUSCULAR REEDUCATION: CPT | Mod: PN,CQ

## 2024-03-22 PROCEDURE — 97110 THERAPEUTIC EXERCISES: CPT | Mod: PN,CQ

## 2024-03-22 PROCEDURE — 97530 THERAPEUTIC ACTIVITIES: CPT | Mod: PN,CQ

## 2024-03-22 NOTE — PROGRESS NOTES
Physical Therapy Treatment Note     Name: Sophia Kc  Clinic Number: [id number]    Therapy Diagnosis:   Encounter Diagnoses   Name Primary?    Plantar fascial fibromatosis Yes    Decreased functional mobility and endurance      Physician: Javier Dunham DPM    Visit Date: 3/22/2024    Physician Orders: PT Eval and Treat    Medical Diagnosis from Referral: bilateral plantar fibromatosis   Evaluation Date: 2/13/2024  Updated Plan of Care Due : 3/13/2024  Authorization Period Expiration: [employer]   Plan of Care Expiration: 3/13/2023  Visit # / Visits authorized: 5/ 20  PTA Visit #: 4    Time In: 158   Time Out: 240  Total Billable Time: 42 minutes    Precautions: Standard    Subjective     Pt reports: I've had a rough day with a lot of walking which always make me hurt more. The pain during the night is a little better.  She was compliant with home exercise program.  Response to previous treatment:less pain  Functional change: ongoing    Pain: 8/10  Location: bilateral feet      Objective     Sophia received therapeutic exercises to develop strength, endurance, and ROM for 10 minutes including:  Bilateral ankle alphabet x 1  Bilateral ankle foot circles cw./ccw x 15  Bilateral ankle dorsiflexion x 15  Bilateral dorsiflexion, eversion, internal rotation blue theraband x 10    Range of motion   Dorsiflexion   right  10      left  11  Hip flexion     right 92     left   89   Hamstrings   right  -35   left   -35    Sophia received the following manual therapy techniques: Myofacial release were applied to the bilateral plantar fascia 8  minutes with deep tissue massage    Sophia participated in neuromuscular re-education activities to improve: Balance, Kinesthetic, and Proprioception for 8 minutes. The following activities   were included:  Slant board x 2' with glut set   Bilateral  hamstrings stretch on step x 5    Sophia participated in dynamic functional therapeutic activities to improve functional  performance for 8  minutes, including:  Biking x 5' to increase endurance with walking  Double support squats on total gym level 10 x 20 to promote squatting and bending  Double support calf raises total gym x 15 to be able to reach on tip toes    Sophia received the following direct contact modalities after being cleared for contraindications: Ultrasound:  Sophia received ultrasound to manage pain and inflammation at 100 % duty cycle applied to the bilateral plantar fascia at an intensity 2.2 W/cm2/ 3 mhz  for a duration of 8 minutes. Patient tolerated treatment well without adverse effects. Therapist was in attendance throughout intervention.    Sophia received the following supervised modalities after being cleared for contradictions:       Home Exercises Provided and Patient Education Provided     Education provided: home exercise program     Written Home Exercises Provided: Patient instructed to cont prior HEP.  Exercises were reviewed and Sophia was able to demonstrate them prior to the end of the session.  Sophia demonstrated good  understanding of the education provided.     See EMR under Patient Instructions for exercises provided prior visit.    Assessment     Patient had increased fibrous nodules along right plantar fascia this session. Patient voicing decreased pain 3/10 after session.   Sophia Is progressing well towards her goals.    Pt prognosis is Good.     Pt will continue to benefit from skilled outpatient physical therapy to address the deficits listed in the problem list box on initial evaluation, provide pt/family education and to maximize pt's level of independence in the home and community environment.     Pt's spiritual, cultural and educational needs considered and pt agreeable to plan of care and goals.     Anticipated barriers to physical therapy: compliance with home exercise program     Goals:  Short Term Goals: 4 weeks   Pt will be independent with home ex program   Pt will  increase bilateral hip flexion to 110 degrees   Pt will improve hamstring length from -38  to -20 degrees bilaterally   Improve dorsiflexion to 10 degrees bilaterally   Decrease pain to 5/10 when first awaking      Long Term Goals: 6 weeks   Pt will increase bilateral hip flexion to 120   Improve hamstring length to -10 bilaterally   Increase bilateral dorsiflexion to 15 degrees   Decrease paint to 2/10    Plan     Plan of care Certification: 2/13/2024 to 3/13/2024.     Outpatient Physical Therapy 2 times weekly for 4 weeks to include the following interventions: Manual Therapy, Neuromuscular Re-ed, Patient Education, Therapeutic Activities, Therapeutic Exercise, Ultrasound, and modalities as needed. Plan of care reviewed with Elver Alexander, PT.    Brisa Richardson, PTA  3/22/2024

## 2024-04-24 PROBLEM — Z74.09 DECREASED FUNCTIONAL MOBILITY AND ENDURANCE: Status: RESOLVED | Noted: 2024-02-20 | Resolved: 2024-04-24

## 2024-04-24 PROBLEM — M72.2 PLANTAR FASCIAL FIBROMATOSIS: Status: RESOLVED | Noted: 2024-02-13 | Resolved: 2024-04-24

## 2024-04-24 NOTE — PLAN OF CARE
Physical Therapy Treatment Note      Name: Sophia Kc  Clinic Number: [id number]     Therapy Diagnosis:        Encounter Diagnoses   Name Primary?    Plantar fascial fibromatosis Yes    Decreased functional mobility and endurance        Physician: Javier Dunham DPM     Visit Date: 3/22/2024     Physician Orders: PT Eval and Treat    Medical Diagnosis from Referral: bilateral plantar fibromatosis   Evaluation Date: 2/13/2024  Updated Plan of Care Due : 3/13/2024  Authorization Period Expiration: [employer]   Plan of Care Expiration: 3/13/2023  Visit # / Visits authorized: 5/ 20  PTA Visit #: 4     Time In: 158   Time Out: 240  Total Billable Time: 42 minutes     Precautions: Standard     Subjective      Pt reports: I've had a rough day with a lot of walking which always make me hurt more. The pain during the night is a little better.  She was compliant with home exercise program.  Response to previous treatment:less pain  Functional change: ongoing     Pain: 8/10  Location: bilateral feet       Objective      Sophia received therapeutic exercises to develop strength, endurance, and ROM for 10 minutes including:  Bilateral ankle alphabet x 1  Bilateral ankle foot circles cw./ccw x 15  Bilateral ankle dorsiflexion x 15  Bilateral dorsiflexion, eversion, internal rotation blue theraband x 10     Range of motion   Dorsiflexion   right  10      left  11  Hip flexion     right 92     left   89   Hamstrings   right  -35   left   -35     Sophia received the following manual therapy techniques: Myofacial release were applied to the bilateral plantar fascia 8  minutes with deep tissue massage     Sophia participated in neuromuscular re-education activities to improve: Balance, Kinesthetic, and Proprioception for 8 minutes. The following activities   were included:  Slant board x 2' with glut set   Bilateral  hamstrings stretch on step x 5     Sophia participated in dynamic functional therapeutic activities to  improve functional performance for 8  minutes, including:  Biking x 5' to increase endurance with walking  Double support squats on total gym level 10 x 20 to promote squatting and bending  Double support calf raises total gym x 15 to be able to reach on tip toes     Sophia received the following direct contact modalities after being cleared for contraindications: Ultrasound:  Sophia received ultrasound to manage pain and inflammation at 100 % duty cycle applied to the bilateral plantar fascia at an intensity 2.2 W/cm2/ 3 mhz  for a duration of 8 minutes. Patient tolerated treatment well without adverse effects. Therapist was in attendance throughout intervention.     Sophia received the following supervised modalities after being cleared for contradictions:         Home Exercises Provided and Patient Education Provided      Education provided: home exercise program      Written Home Exercises Provided: Patient instructed to cont prior HEP.  Exercises were reviewed and Sophia was able to demonstrate them prior to the end of the session.  Sophia demonstrated good  understanding of the education provided.      See EMR under Patient Instructions for exercises provided prior visit.     Assessment      Patient had increased fibrous nodules along right plantar fascia this session. Patient voicing decreased pain 3/10 after session.   Sophia Is progressing well towards her goals.    Pt prognosis is Good.      Pt will continue to benefit from skilled outpatient physical therapy to address the deficits listed in the problem list box on initial evaluation, provide pt/family education and to maximize pt's level of independence in the home and community environment.      Pt's spiritual, cultural and educational needs considered and pt agreeable to plan of care and goals.     Anticipated barriers to physical therapy: compliance with home exercise program      Goals:  Short Term Goals: 4 weeks   Pt will be independent with  home ex program   Pt will increase bilateral hip flexion to 110 degrees   Pt will improve hamstring length from -38  to -20 degrees bilaterally   Improve dorsiflexion to 10 degrees bilaterally   Decrease pain to 5/10 when first awaking      Long Term Goals: 6 weeks   Pt will increase bilateral hip flexion to 120   Improve hamstring length to -10 bilaterally   Increase bilateral dorsiflexion to 15 degrees   Decrease paint to 2/10     Plan     Outpatient Therapy Discharge Summary     Name: Sophia Kc  Clinic Number: [id number]    Therapy Diagnosis:   Encounter Diagnoses   Name Primary?    Plantar fascial fibromatosis Yes    Decreased functional mobility and endurance      Physician: Javier Dunham, DANIELLE              Assessment    Goals:  Pt improved with range of motion and strength , pt still voicing pain 8/10 last.  tx .     Discharge reason: Patient has not attended therapy since 3/22/2024     Plan   This patient is discharged from Physical Therapy.   Elver Alexander, PT

## 2024-06-19 ENCOUNTER — HOSPITAL ENCOUNTER (OUTPATIENT)
Dept: RADIOLOGY | Facility: HOSPITAL | Age: 59
Discharge: HOME | End: 2024-06-19
Attending: FAMILY MEDICINE
Payer: COMMERCIAL

## 2024-06-19 VITALS — HEIGHT: 61 IN | BODY MASS INDEX: 36.25 KG/M2 | WEIGHT: 192 LBS

## 2024-06-19 DIAGNOSIS — Z12.31 OTHER SCREENING MAMMOGRAM: ICD-10-CM

## 2024-06-19 PROCEDURE — 77067 SCR MAMMO BI INCL CAD: CPT | Mod: TC

## 2024-08-21 ENCOUNTER — TELEPHONE (OUTPATIENT)
Dept: GASTROENTEROLOGY | Facility: CLINIC | Age: 59
End: 2024-08-21
Payer: COMMERCIAL

## 2024-08-21 DIAGNOSIS — Z12.11 ENCOUNTER FOR SCREENING COLONOSCOPY: Primary | ICD-10-CM

## 2025-01-31 ENCOUNTER — APPOINTMENT (OUTPATIENT)
Dept: RADIOLOGY | Facility: HOSPITAL | Age: 60
End: 2025-01-31
Payer: COMMERCIAL

## 2025-01-31 ENCOUNTER — HOSPITAL ENCOUNTER (EMERGENCY)
Facility: HOSPITAL | Age: 60
Discharge: HOME | End: 2025-01-31
Payer: OTHER MISCELLANEOUS

## 2025-01-31 VITALS
DIASTOLIC BLOOD PRESSURE: 84 MMHG | RESPIRATION RATE: 16 BRPM | SYSTOLIC BLOOD PRESSURE: 117 MMHG | HEIGHT: 61 IN | BODY MASS INDEX: 28.89 KG/M2 | WEIGHT: 153 LBS | HEART RATE: 78 BPM | OXYGEN SATURATION: 96 % | TEMPERATURE: 98 F

## 2025-01-31 DIAGNOSIS — R52 PAIN: ICD-10-CM

## 2025-01-31 DIAGNOSIS — M25.511 ACUTE PAIN OF RIGHT SHOULDER: ICD-10-CM

## 2025-01-31 DIAGNOSIS — N30.00 ACUTE CYSTITIS WITHOUT HEMATURIA: ICD-10-CM

## 2025-01-31 DIAGNOSIS — M43.6 NECK STIFFNESS: Primary | ICD-10-CM

## 2025-01-31 LAB
AMPHET UR QL SCN: NEGATIVE
BACTERIA #/AREA URNS HPF: ABNORMAL /HPF
BARBITURATES UR QL SCN: NEGATIVE
BENZODIAZ METAB UR QL SCN: NEGATIVE
BILIRUB UR QL STRIP: NEGATIVE
CANNABINOIDS UR QL SCN: NEGATIVE
CLARITY UR: ABNORMAL
COCAINE UR QL SCN: NEGATIVE
COLOR UR: YELLOW
GLUCOSE UR STRIP-MCNC: NEGATIVE MG/DL
KETONES UR STRIP-SCNC: NEGATIVE MG/DL
LEUKOCYTE ESTERASE UR QL STRIP: ABNORMAL
MUCOUS THREADS #/AREA URNS HPF: ABNORMAL /HPF
NITRITE UR QL STRIP: NEGATIVE
OPIATES UR QL SCN: NEGATIVE
PCP UR QL SCN: NEGATIVE
PH UR STRIP: 7 PH UNITS
PROT UR QL STRIP: ABNORMAL
RBC # UR STRIP: NEGATIVE /UL
SP GR UR STRIP: 1.02
SQUAMOUS #/AREA URNS LPF: ABNORMAL /LPF
UROBILINOGEN UR STRIP-ACNC: 2 MG/DL
WBC #/AREA URNS HPF: ABNORMAL /HPF

## 2025-01-31 PROCEDURE — 72040 X-RAY EXAM NECK SPINE 2-3 VW: CPT | Mod: TC

## 2025-01-31 PROCEDURE — 99284 EMERGENCY DEPT VISIT MOD MDM: CPT | Mod: 25

## 2025-01-31 PROCEDURE — 63600175 PHARM REV CODE 636 W HCPCS

## 2025-01-31 PROCEDURE — 99284 EMERGENCY DEPT VISIT MOD MDM: CPT | Mod: ,,,

## 2025-01-31 PROCEDURE — 96372 THER/PROPH/DIAG INJ SC/IM: CPT | Mod: JZ

## 2025-01-31 PROCEDURE — 72040 X-RAY EXAM NECK SPINE 2-3 VW: CPT | Mod: 26,,, | Performed by: RADIOLOGY

## 2025-01-31 PROCEDURE — 80307 DRUG TEST PRSMV CHEM ANLYZR: CPT

## 2025-01-31 PROCEDURE — 81003 URINALYSIS AUTO W/O SCOPE: CPT

## 2025-01-31 RX ORDER — NITROFURANTOIN 25; 75 MG/1; MG/1
100 CAPSULE ORAL 2 TIMES DAILY
Qty: 10 CAPSULE | Refills: 0 | Status: SHIPPED | OUTPATIENT
Start: 2025-01-31 | End: 2025-02-05

## 2025-01-31 RX ORDER — METFORMIN HYDROCHLORIDE 500 MG/1
TABLET, EXTENDED RELEASE ORAL
COMMUNITY
Start: 2024-12-26

## 2025-01-31 RX ORDER — ORPHENADRINE CITRATE 30 MG/ML
60 INJECTION INTRAMUSCULAR; INTRAVENOUS
Status: COMPLETED | OUTPATIENT
Start: 2025-01-31 | End: 2025-01-31

## 2025-01-31 RX ORDER — KETOROLAC TROMETHAMINE 30 MG/ML
60 INJECTION, SOLUTION INTRAMUSCULAR; INTRAVENOUS
Status: COMPLETED | OUTPATIENT
Start: 2025-01-31 | End: 2025-01-31

## 2025-01-31 RX ORDER — METHOCARBAMOL 500 MG/1
500 TABLET, FILM COATED ORAL 4 TIMES DAILY
Qty: 40 TABLET | Refills: 0 | Status: SHIPPED | OUTPATIENT
Start: 2025-01-31 | End: 2025-02-10

## 2025-01-31 RX ORDER — DICLOFENAC SODIUM 75 MG/1
75 TABLET, DELAYED RELEASE ORAL 2 TIMES DAILY
Qty: 20 TABLET | Refills: 0 | Status: SHIPPED | OUTPATIENT
Start: 2025-01-31 | End: 2025-02-10

## 2025-01-31 RX ADMIN — KETOROLAC TROMETHAMINE 60 MG: 30 INJECTION, SOLUTION INTRAMUSCULAR at 11:01

## 2025-01-31 RX ADMIN — ORPHENADRINE CITRATE 60 MG: 60 INJECTION INTRAMUSCULAR; INTRAVENOUS at 11:01

## 2025-01-31 NOTE — Clinical Note
"Sophia Gtza" De was seen and treated in our emergency department on 1/31/2025.  She may return to work on 02/03/2025.       If you have any questions or concerns, please don't hesitate to call.      Ulices Aleman, STEPHANIE"
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
## 2025-01-31 NOTE — DISCHARGE INSTRUCTIONS
- Take medication as directed by the label on the bottle.  - Drink plenty of water.  - Follow up local pcp as needed.

## 2025-06-27 ENCOUNTER — HOSPITAL ENCOUNTER (OUTPATIENT)
Dept: RADIOLOGY | Facility: HOSPITAL | Age: 60
Discharge: HOME | End: 2025-06-27
Attending: FAMILY MEDICINE
Payer: COMMERCIAL

## 2025-06-27 DIAGNOSIS — Z12.31 OTHER SCREENING MAMMOGRAM: ICD-10-CM

## 2025-06-27 PROCEDURE — 77067 SCR MAMMO BI INCL CAD: CPT | Mod: TC

## 2025-06-27 PROCEDURE — 77063 BREAST TOMOSYNTHESIS BI: CPT | Mod: 26,,, | Performed by: RADIOLOGY

## 2025-06-27 PROCEDURE — 77067 SCR MAMMO BI INCL CAD: CPT | Mod: 26,,, | Performed by: RADIOLOGY
